# Patient Record
Sex: FEMALE | Race: WHITE | Employment: OTHER | URBAN - NONMETROPOLITAN AREA
[De-identification: names, ages, dates, MRNs, and addresses within clinical notes are randomized per-mention and may not be internally consistent; named-entity substitution may affect disease eponyms.]

---

## 2017-01-05 ENCOUNTER — OFFICE VISIT (OUTPATIENT)
Dept: PHYSICAL MEDICINE AND REHAB | Age: 47
End: 2017-01-05

## 2017-01-05 VITALS
BODY MASS INDEX: 34.16 KG/M2 | HEART RATE: 101 BPM | HEIGHT: 65 IN | SYSTOLIC BLOOD PRESSURE: 135 MMHG | DIASTOLIC BLOOD PRESSURE: 80 MMHG | WEIGHT: 205 LBS

## 2017-01-05 DIAGNOSIS — M54.10 RADICULAR SYNDROME OF RIGHT LEG: Primary | ICD-10-CM

## 2017-01-05 DIAGNOSIS — R60.9 PERIPHERAL EDEMA: ICD-10-CM

## 2017-01-05 DIAGNOSIS — M51.36 DDD (DEGENERATIVE DISC DISEASE), LUMBAR: ICD-10-CM

## 2017-01-05 PROCEDURE — 99214 OFFICE O/P EST MOD 30 MIN: CPT | Performed by: PHYSICAL MEDICINE & REHABILITATION

## 2017-01-05 RX ORDER — OXYCODONE HYDROCHLORIDE AND ACETAMINOPHEN 5; 325 MG/1; MG/1
TABLET ORAL
Qty: 150 TABLET | Refills: 0 | Status: SHIPPED | OUTPATIENT
Start: 2017-01-05 | End: 2017-04-10 | Stop reason: ALTCHOICE

## 2017-01-05 RX ORDER — HYDROCHLOROTHIAZIDE 25 MG/1
25 TABLET ORAL DAILY
Qty: 30 TABLET | Refills: 11 | Status: SHIPPED | OUTPATIENT
Start: 2017-01-05

## 2017-01-05 RX ORDER — OXYCODONE HYDROCHLORIDE AND ACETAMINOPHEN 5; 325 MG/1; MG/1
TABLET ORAL
Qty: 150 TABLET | Refills: 0 | Status: SHIPPED | OUTPATIENT
Start: 2017-01-05 | End: 2017-04-10 | Stop reason: SDUPTHER

## 2017-04-10 ENCOUNTER — OFFICE VISIT (OUTPATIENT)
Dept: PHYSICAL MEDICINE AND REHAB | Age: 47
End: 2017-04-10

## 2017-04-10 VITALS
SYSTOLIC BLOOD PRESSURE: 120 MMHG | HEART RATE: 88 BPM | HEIGHT: 65 IN | DIASTOLIC BLOOD PRESSURE: 79 MMHG | BODY MASS INDEX: 34.82 KG/M2 | WEIGHT: 209 LBS

## 2017-04-10 DIAGNOSIS — M54.10 RADICULAR SYNDROME OF RIGHT LEG: ICD-10-CM

## 2017-04-10 DIAGNOSIS — M51.36 DDD (DEGENERATIVE DISC DISEASE), LUMBAR: Primary | ICD-10-CM

## 2017-04-10 DIAGNOSIS — M25.562 ACUTE PAIN OF BOTH KNEES: ICD-10-CM

## 2017-04-10 DIAGNOSIS — M25.561 ACUTE PAIN OF BOTH KNEES: ICD-10-CM

## 2017-04-10 PROCEDURE — 99214 OFFICE O/P EST MOD 30 MIN: CPT | Performed by: PHYSICAL MEDICINE & REHABILITATION

## 2017-04-10 RX ORDER — POLYETHYLENE GLYCOL 3350 17 G/17G
17 POWDER, FOR SOLUTION ORAL DAILY PRN
Qty: 30 BOTTLE | Refills: 11 | Status: SHIPPED | OUTPATIENT
Start: 2017-04-10 | End: 2021-11-15 | Stop reason: ALTCHOICE

## 2017-04-10 RX ORDER — AMOXICILLIN 250 MG
2 CAPSULE ORAL 2 TIMES DAILY
Qty: 120 TABLET | Refills: 11 | Status: SHIPPED | OUTPATIENT
Start: 2017-04-10 | End: 2018-04-08 | Stop reason: SDUPTHER

## 2017-04-10 RX ORDER — OXYCODONE HYDROCHLORIDE AND ACETAMINOPHEN 5; 325 MG/1; MG/1
TABLET ORAL
Qty: 150 TABLET | Refills: 0 | Status: SHIPPED | OUTPATIENT
Start: 2017-04-10 | End: 2017-07-10 | Stop reason: SDUPTHER

## 2017-04-10 RX ORDER — OXYCODONE HYDROCHLORIDE AND ACETAMINOPHEN 5; 325 MG/1; MG/1
TABLET ORAL
Qty: 150 TABLET | Refills: 0 | Status: SHIPPED | OUTPATIENT
Start: 2017-04-10 | End: 2017-06-12 | Stop reason: SDUPTHER

## 2017-04-10 RX ORDER — FLUTICASONE PROPIONATE 110 UG/1
1 AEROSOL, METERED RESPIRATORY (INHALATION) 2 TIMES DAILY
COMMUNITY
End: 2021-11-15 | Stop reason: ALTCHOICE

## 2017-06-12 RX ORDER — OXYCODONE HYDROCHLORIDE AND ACETAMINOPHEN 5; 325 MG/1; MG/1
TABLET ORAL
Qty: 150 TABLET | Refills: 0 | Status: SHIPPED | OUTPATIENT
Start: 2017-06-13 | End: 2017-07-10 | Stop reason: SDUPTHER

## 2017-06-26 RX ORDER — BACLOFEN 20 MG/1
TABLET ORAL
Qty: 90 TABLET | Refills: 5 | Status: SHIPPED | OUTPATIENT
Start: 2017-06-26 | End: 2017-10-26 | Stop reason: SDUPTHER

## 2017-07-10 ENCOUNTER — OFFICE VISIT (OUTPATIENT)
Dept: PHYSICAL MEDICINE AND REHAB | Age: 47
End: 2017-07-10

## 2017-07-10 VITALS
DIASTOLIC BLOOD PRESSURE: 86 MMHG | WEIGHT: 211.5 LBS | HEIGHT: 65 IN | BODY MASS INDEX: 35.24 KG/M2 | HEART RATE: 87 BPM | SYSTOLIC BLOOD PRESSURE: 138 MMHG

## 2017-07-10 DIAGNOSIS — M25.562 ACUTE PAIN OF BOTH KNEES: ICD-10-CM

## 2017-07-10 DIAGNOSIS — M25.561 ACUTE PAIN OF BOTH KNEES: ICD-10-CM

## 2017-07-10 DIAGNOSIS — M54.10 RADICULAR SYNDROME OF RIGHT LEG: ICD-10-CM

## 2017-07-10 DIAGNOSIS — M51.36 DDD (DEGENERATIVE DISC DISEASE), LUMBAR: Primary | ICD-10-CM

## 2017-07-10 PROCEDURE — 99213 OFFICE O/P EST LOW 20 MIN: CPT | Performed by: NURSE PRACTITIONER

## 2017-07-10 RX ORDER — OXYCODONE HYDROCHLORIDE AND ACETAMINOPHEN 5; 325 MG/1; MG/1
TABLET ORAL
Qty: 150 TABLET | Refills: 0 | Status: SHIPPED | OUTPATIENT
Start: 2017-07-10 | End: 2017-10-26 | Stop reason: SDUPTHER

## 2017-07-10 RX ORDER — GABAPENTIN 600 MG/1
1200 TABLET ORAL 3 TIMES DAILY
Qty: 180 TABLET | Refills: 11 | Status: SHIPPED | OUTPATIENT
Start: 2017-07-10 | End: 2018-06-21 | Stop reason: SDUPTHER

## 2017-07-10 RX ORDER — AMITRIPTYLINE HYDROCHLORIDE 50 MG/1
50 TABLET, FILM COATED ORAL NIGHTLY
Qty: 30 TABLET | Refills: 11 | Status: SHIPPED | OUTPATIENT
Start: 2017-07-10 | End: 2017-10-26 | Stop reason: SDUPTHER

## 2017-07-10 RX ORDER — OXYCODONE HYDROCHLORIDE AND ACETAMINOPHEN 5; 325 MG/1; MG/1
TABLET ORAL
Qty: 150 TABLET | Refills: 0 | Status: SHIPPED | OUTPATIENT
Start: 2017-07-10 | End: 2017-10-02 | Stop reason: SDUPTHER

## 2017-07-10 RX ORDER — CYCLOBENZAPRINE HCL 10 MG
10 TABLET ORAL EVERY 8 HOURS PRN
Qty: 90 TABLET | Refills: 11 | Status: SHIPPED | OUTPATIENT
Start: 2017-07-10 | End: 2018-04-30

## 2017-09-27 ENCOUNTER — TELEPHONE (OUTPATIENT)
Dept: PHYSICAL MEDICINE AND REHAB | Age: 47
End: 2017-09-27

## 2017-10-02 RX ORDER — OXYCODONE HYDROCHLORIDE AND ACETAMINOPHEN 5; 325 MG/1; MG/1
TABLET ORAL
Qty: 150 TABLET | Refills: 0 | Status: SHIPPED | OUTPATIENT
Start: 2017-10-13 | End: 2017-10-26 | Stop reason: SDUPTHER

## 2017-10-05 ENCOUNTER — TELEPHONE (OUTPATIENT)
Dept: PHYSICAL MEDICINE AND REHAB | Age: 47
End: 2017-10-05

## 2017-10-05 NOTE — TELEPHONE ENCOUNTER
----- Message from Emanuel Claudio MD sent at 10/5/2017  7:37 AM EDT -----  Please call patient. She does have some breakdown of cartilage noted in the left knee, otherwise unremarkable bilateral knee xrays.

## 2017-10-26 ENCOUNTER — OFFICE VISIT (OUTPATIENT)
Dept: PHYSICAL MEDICINE AND REHAB | Age: 47
End: 2017-10-26
Payer: COMMERCIAL

## 2017-10-26 VITALS
WEIGHT: 193 LBS | HEART RATE: 94 BPM | DIASTOLIC BLOOD PRESSURE: 73 MMHG | SYSTOLIC BLOOD PRESSURE: 115 MMHG | BODY MASS INDEX: 32.95 KG/M2 | HEIGHT: 64 IN

## 2017-10-26 DIAGNOSIS — M25.561 ACUTE PAIN OF BOTH KNEES: ICD-10-CM

## 2017-10-26 DIAGNOSIS — M51.36 DDD (DEGENERATIVE DISC DISEASE), LUMBAR: Primary | ICD-10-CM

## 2017-10-26 DIAGNOSIS — M25.562 ACUTE PAIN OF BOTH KNEES: ICD-10-CM

## 2017-10-26 DIAGNOSIS — M54.10 RADICULAR SYNDROME OF RIGHT LEG: ICD-10-CM

## 2017-10-26 PROCEDURE — 99213 OFFICE O/P EST LOW 20 MIN: CPT | Performed by: NURSE PRACTITIONER

## 2017-10-26 PROCEDURE — 4004F PT TOBACCO SCREEN RCVD TLK: CPT | Performed by: NURSE PRACTITIONER

## 2017-10-26 PROCEDURE — G8417 CALC BMI ABV UP PARAM F/U: HCPCS | Performed by: NURSE PRACTITIONER

## 2017-10-26 PROCEDURE — G8427 DOCREV CUR MEDS BY ELIG CLIN: HCPCS | Performed by: NURSE PRACTITIONER

## 2017-10-26 PROCEDURE — G8484 FLU IMMUNIZE NO ADMIN: HCPCS | Performed by: NURSE PRACTITIONER

## 2017-10-26 RX ORDER — OXYCODONE HYDROCHLORIDE AND ACETAMINOPHEN 5; 325 MG/1; MG/1
TABLET ORAL
Qty: 150 TABLET | Refills: 0 | Status: SHIPPED | OUTPATIENT
Start: 2017-10-26 | End: 2018-01-29 | Stop reason: SDUPTHER

## 2017-10-26 RX ORDER — BACLOFEN 20 MG/1
TABLET ORAL
Qty: 90 TABLET | Refills: 5 | Status: SHIPPED | OUTPATIENT
Start: 2017-10-26 | End: 2018-01-29 | Stop reason: SDUPTHER

## 2017-10-26 RX ORDER — AMITRIPTYLINE HYDROCHLORIDE 75 MG/1
75 TABLET, FILM COATED ORAL NIGHTLY
Qty: 30 TABLET | Refills: 1 | Status: SHIPPED | OUTPATIENT
Start: 2017-10-26 | End: 2017-12-23 | Stop reason: SDUPTHER

## 2017-10-26 NOTE — PROGRESS NOTES
and lower extremity pulses; Edema: no      Impression:  · Low back pain  · DDD lumbar spine s/p decompression and fusion 1/2014  · L5-S1 disc herniation  · L4-5 disc herniation  · Bilateral knee pain  · Right lower limb radiculopathy    Plan:  · Flexeril TID  · Baclofen 20 mg TID  · Percocet PRN for pain  · Continue gabapentin  · Increase amitriptyline to 75 mg nightly  · Encouraged patient to try OTC support brace for bilateral knees    Will continue to monitor any benefits vs side effects of the medications as prescribed. The patient has been warned about the risk of operating machinery including driving if impaired in any way by these medications. The patient also accepts the risks of tolerance, dependency, or addiction related to the prescribed medications. All questions were answered. Reevaluation as planned, or sooner if requested. Controlled Substances Monitoring: Attestation: The Prescription Monitoring Report for this patient was reviewed today. Andry Stockton NP)  Documentation: Possible medication side effects, risk of tolerance and/or dependence, and alternative treatments discussed., No signs of potential drug abuse or diversion identified. Andry Stockton NP)    Return in about 3 months (around 1/26/2018). It was my pleasure to evaluate Yohan Barrett today. Please call with any concerns or questions. 15 minutes spent in evaluation efforts    Andry Stockton NP     Yohan Barrett was evaluated today and a DME order was entered for a lightweight wheelchair because she requires this to successfully complete daily living tasks of personal cares and ambulating. A lightweight wheelchair is necessary due to the patient's impaired ambulation and mobility restrictions. The patient would not be able to resolve these daily living tasks using a cane or walker.   The patient can self-propel a lightweight wheelchair safely in their home and can maneuver within their home with adequate access. The patient cannot self-propel in a standard wheelchair. The need for this equipment was discussed with the patient and she understands, is in agreement, and has not expressed an unwillingness to use the wheelchair.

## 2017-11-08 ENCOUNTER — TELEPHONE (OUTPATIENT)
Dept: PHYSICAL MEDICINE AND REHAB | Age: 47
End: 2017-11-08

## 2017-11-08 DIAGNOSIS — M54.10 RADICULAR SYNDROME OF RIGHT LEG: ICD-10-CM

## 2017-11-08 DIAGNOSIS — M51.36 DDD (DEGENERATIVE DISC DISEASE), LUMBAR: ICD-10-CM

## 2017-11-08 DIAGNOSIS — R29.898 WEAKNESS OF BOTH UPPER EXTREMITIES: Primary | ICD-10-CM

## 2017-11-08 DIAGNOSIS — R26.9 GAIT DISTURBANCE: ICD-10-CM

## 2017-11-08 NOTE — TELEPHONE ENCOUNTER
Aida Choi from [employer] called regarding recent order for wheelchair. Order was declined by insurance. Aida Choi requesting Dx of upper body weakness to help appeal denial. Please advise.

## 2017-11-10 ENCOUNTER — TELEPHONE (OUTPATIENT)
Dept: PHYSICAL MEDICINE AND REHAB | Age: 47
End: 2017-11-10

## 2017-12-26 RX ORDER — AMITRIPTYLINE HYDROCHLORIDE 75 MG/1
TABLET, FILM COATED ORAL
Qty: 30 TABLET | Refills: 1 | Status: SHIPPED | OUTPATIENT
Start: 2017-12-26 | End: 2018-01-29 | Stop reason: SDUPTHER

## 2018-01-29 ENCOUNTER — OFFICE VISIT (OUTPATIENT)
Dept: PHYSICAL MEDICINE AND REHAB | Age: 48
End: 2018-01-29
Payer: COMMERCIAL

## 2018-01-29 VITALS
BODY MASS INDEX: 34.66 KG/M2 | HEART RATE: 91 BPM | WEIGHT: 203 LBS | DIASTOLIC BLOOD PRESSURE: 80 MMHG | SYSTOLIC BLOOD PRESSURE: 117 MMHG | HEIGHT: 64 IN

## 2018-01-29 DIAGNOSIS — M51.36 DDD (DEGENERATIVE DISC DISEASE), LUMBAR: Primary | ICD-10-CM

## 2018-01-29 DIAGNOSIS — M25.561 CHRONIC PAIN OF BOTH KNEES: ICD-10-CM

## 2018-01-29 DIAGNOSIS — M25.562 CHRONIC PAIN OF BOTH KNEES: ICD-10-CM

## 2018-01-29 DIAGNOSIS — G89.29 CHRONIC PAIN OF BOTH KNEES: ICD-10-CM

## 2018-01-29 DIAGNOSIS — M54.10 RADICULAR SYNDROME OF RIGHT LEG: ICD-10-CM

## 2018-01-29 PROCEDURE — 99213 OFFICE O/P EST LOW 20 MIN: CPT | Performed by: NURSE PRACTITIONER

## 2018-01-29 PROCEDURE — 4004F PT TOBACCO SCREEN RCVD TLK: CPT | Performed by: NURSE PRACTITIONER

## 2018-01-29 PROCEDURE — G8484 FLU IMMUNIZE NO ADMIN: HCPCS | Performed by: NURSE PRACTITIONER

## 2018-01-29 PROCEDURE — G8417 CALC BMI ABV UP PARAM F/U: HCPCS | Performed by: NURSE PRACTITIONER

## 2018-01-29 PROCEDURE — G8427 DOCREV CUR MEDS BY ELIG CLIN: HCPCS | Performed by: NURSE PRACTITIONER

## 2018-01-29 RX ORDER — OXYCODONE HYDROCHLORIDE AND ACETAMINOPHEN 5; 325 MG/1; MG/1
TABLET ORAL
Qty: 150 TABLET | Refills: 0 | Status: SHIPPED | OUTPATIENT
Start: 2018-01-29 | End: 2018-04-10

## 2018-01-29 RX ORDER — AMITRIPTYLINE HYDROCHLORIDE 75 MG/1
TABLET, FILM COATED ORAL
Qty: 30 TABLET | Refills: 5 | Status: SHIPPED | OUTPATIENT
Start: 2018-01-29 | End: 2018-06-21 | Stop reason: SDUPTHER

## 2018-01-29 RX ORDER — BACLOFEN 20 MG/1
20 TABLET ORAL 4 TIMES DAILY
Qty: 120 TABLET | Refills: 11 | Status: SHIPPED | OUTPATIENT
Start: 2018-01-29 | End: 2018-12-18 | Stop reason: SDUPTHER

## 2018-01-29 RX ORDER — OXYCODONE HYDROCHLORIDE AND ACETAMINOPHEN 5; 325 MG/1; MG/1
TABLET ORAL
Qty: 150 TABLET | Refills: 0 | Status: SHIPPED | OUTPATIENT
Start: 2018-01-29 | End: 2018-04-30 | Stop reason: SDUPTHER

## 2018-01-29 RX ORDER — OXYCODONE HYDROCHLORIDE AND ACETAMINOPHEN 5; 325 MG/1; MG/1
TABLET ORAL
Qty: 150 TABLET | Refills: 0 | Status: SHIPPED | OUTPATIENT
Start: 2018-01-29 | End: 2018-03-10

## 2018-04-09 RX ORDER — DOCUSATE SODIUM -SENNOSIDES 50; 8.6 MG/1; MG/1
TABLET, COATED ORAL
Qty: 120 TABLET | Refills: 0 | Status: SHIPPED | OUTPATIENT
Start: 2018-04-09 | End: 2018-05-21 | Stop reason: SDUPTHER

## 2018-04-30 ENCOUNTER — OFFICE VISIT (OUTPATIENT)
Dept: PHYSICAL MEDICINE AND REHAB | Age: 48
End: 2018-04-30
Payer: COMMERCIAL

## 2018-04-30 VITALS
HEART RATE: 91 BPM | DIASTOLIC BLOOD PRESSURE: 89 MMHG | WEIGHT: 203.04 LBS | SYSTOLIC BLOOD PRESSURE: 142 MMHG | BODY MASS INDEX: 34.66 KG/M2 | HEIGHT: 64 IN

## 2018-04-30 DIAGNOSIS — G89.29 CHRONIC PAIN OF BOTH KNEES: ICD-10-CM

## 2018-04-30 DIAGNOSIS — M25.562 CHRONIC PAIN OF BOTH KNEES: ICD-10-CM

## 2018-04-30 DIAGNOSIS — M54.10 RADICULAR SYNDROME OF RIGHT LEG: ICD-10-CM

## 2018-04-30 DIAGNOSIS — M54.10 RADICULAR SYNDROME: ICD-10-CM

## 2018-04-30 DIAGNOSIS — M51.36 DDD (DEGENERATIVE DISC DISEASE), LUMBAR: Primary | ICD-10-CM

## 2018-04-30 DIAGNOSIS — M54.42 CHRONIC RIGHT-SIDED LOW BACK PAIN WITH BILATERAL SCIATICA: ICD-10-CM

## 2018-04-30 DIAGNOSIS — M25.561 CHRONIC PAIN OF BOTH KNEES: ICD-10-CM

## 2018-04-30 DIAGNOSIS — M54.41 CHRONIC RIGHT-SIDED LOW BACK PAIN WITH BILATERAL SCIATICA: ICD-10-CM

## 2018-04-30 DIAGNOSIS — G89.29 CHRONIC RIGHT-SIDED LOW BACK PAIN WITH BILATERAL SCIATICA: ICD-10-CM

## 2018-04-30 PROCEDURE — 99213 OFFICE O/P EST LOW 20 MIN: CPT | Performed by: NURSE PRACTITIONER

## 2018-04-30 PROCEDURE — G8417 CALC BMI ABV UP PARAM F/U: HCPCS | Performed by: NURSE PRACTITIONER

## 2018-04-30 PROCEDURE — 1036F TOBACCO NON-USER: CPT | Performed by: NURSE PRACTITIONER

## 2018-04-30 PROCEDURE — G8427 DOCREV CUR MEDS BY ELIG CLIN: HCPCS | Performed by: NURSE PRACTITIONER

## 2018-04-30 RX ORDER — OXYCODONE HYDROCHLORIDE AND ACETAMINOPHEN 5; 325 MG/1; MG/1
TABLET ORAL
Qty: 150 TABLET | Refills: 0 | Status: SHIPPED | OUTPATIENT
Start: 2018-04-30 | End: 2018-08-08

## 2018-04-30 RX ORDER — OXYCODONE HYDROCHLORIDE AND ACETAMINOPHEN 5; 325 MG/1; MG/1
TABLET ORAL
Qty: 150 TABLET | Refills: 0 | Status: SHIPPED | OUTPATIENT
Start: 2018-04-30 | End: 2018-06-21 | Stop reason: SDUPTHER

## 2018-04-30 RX ORDER — TIZANIDINE HYDROCHLORIDE 2 MG/1
2 CAPSULE, GELATIN COATED ORAL 3 TIMES DAILY
Qty: 90 CAPSULE | Refills: 0 | Status: SHIPPED | OUTPATIENT
Start: 2018-04-30 | End: 2018-05-17

## 2018-05-16 ENCOUNTER — TELEPHONE (OUTPATIENT)
Dept: PHYSICAL MEDICINE AND REHAB | Age: 48
End: 2018-05-16

## 2018-05-17 RX ORDER — CYCLOBENZAPRINE HCL 10 MG
10 TABLET ORAL EVERY 8 HOURS PRN
Qty: 90 TABLET | Refills: 1 | Status: SHIPPED | OUTPATIENT
Start: 2018-05-17 | End: 2018-09-20 | Stop reason: SDUPTHER

## 2018-05-21 RX ORDER — AMOXICILLIN 250 MG
CAPSULE ORAL
Qty: 120 TABLET | Refills: 11 | Status: SHIPPED | OUTPATIENT
Start: 2018-05-21

## 2018-05-22 ENCOUNTER — TELEPHONE (OUTPATIENT)
Dept: PHYSICAL MEDICINE AND REHAB | Age: 48
End: 2018-05-22

## 2018-05-22 DIAGNOSIS — M51.36 DDD (DEGENERATIVE DISC DISEASE), LUMBAR: Primary | ICD-10-CM

## 2018-05-22 DIAGNOSIS — M51.27 HERNIATION OF INTERVERTEBRAL DISC BETWEEN L5 AND S1: ICD-10-CM

## 2018-05-22 DIAGNOSIS — M51.26 LUMBAR DISC HERNIATION: ICD-10-CM

## 2018-05-25 ENCOUNTER — HOSPITAL ENCOUNTER (OUTPATIENT)
Dept: PHYSICAL THERAPY | Age: 48
Setting detail: THERAPIES SERIES
Discharge: HOME | End: 2018-05-25
Payer: COMMERCIAL

## 2018-06-21 ENCOUNTER — OFFICE VISIT (OUTPATIENT)
Dept: PHYSICAL MEDICINE AND REHAB | Age: 48
End: 2018-06-21
Payer: COMMERCIAL

## 2018-06-21 VITALS
SYSTOLIC BLOOD PRESSURE: 126 MMHG | BODY MASS INDEX: 34.49 KG/M2 | HEIGHT: 65 IN | HEART RATE: 102 BPM | DIASTOLIC BLOOD PRESSURE: 78 MMHG | WEIGHT: 207 LBS

## 2018-06-21 DIAGNOSIS — M25.562 CHRONIC PAIN OF BOTH KNEES: ICD-10-CM

## 2018-06-21 DIAGNOSIS — M51.36 DDD (DEGENERATIVE DISC DISEASE), LUMBAR: ICD-10-CM

## 2018-06-21 DIAGNOSIS — M79.605 LUMBAR PAIN WITH RADIATION DOWN BOTH LEGS: Primary | ICD-10-CM

## 2018-06-21 DIAGNOSIS — M79.604 LUMBAR PAIN WITH RADIATION DOWN BOTH LEGS: Primary | ICD-10-CM

## 2018-06-21 DIAGNOSIS — M54.10 RADICULAR SYNDROME: ICD-10-CM

## 2018-06-21 DIAGNOSIS — M25.561 CHRONIC PAIN OF BOTH KNEES: ICD-10-CM

## 2018-06-21 DIAGNOSIS — G89.29 CHRONIC PAIN OF BOTH KNEES: ICD-10-CM

## 2018-06-21 DIAGNOSIS — M54.50 LUMBAR PAIN WITH RADIATION DOWN BOTH LEGS: Primary | ICD-10-CM

## 2018-06-21 PROCEDURE — 99213 OFFICE O/P EST LOW 20 MIN: CPT | Performed by: NURSE PRACTITIONER

## 2018-06-21 PROCEDURE — G8427 DOCREV CUR MEDS BY ELIG CLIN: HCPCS | Performed by: NURSE PRACTITIONER

## 2018-06-21 PROCEDURE — 1036F TOBACCO NON-USER: CPT | Performed by: NURSE PRACTITIONER

## 2018-06-21 PROCEDURE — G8417 CALC BMI ABV UP PARAM F/U: HCPCS | Performed by: NURSE PRACTITIONER

## 2018-06-21 RX ORDER — AMITRIPTYLINE HYDROCHLORIDE 75 MG/1
TABLET, FILM COATED ORAL
Qty: 30 TABLET | Refills: 5 | Status: SHIPPED | OUTPATIENT
Start: 2018-06-21 | End: 2018-12-04 | Stop reason: SDUPTHER

## 2018-06-21 RX ORDER — GABAPENTIN 600 MG/1
1200 TABLET ORAL 3 TIMES DAILY
Qty: 180 TABLET | Refills: 11 | Status: SHIPPED | OUTPATIENT
Start: 2018-06-21 | End: 2018-09-24

## 2018-06-21 RX ORDER — OXYCODONE HYDROCHLORIDE AND ACETAMINOPHEN 5; 325 MG/1; MG/1
TABLET ORAL
Qty: 150 TABLET | Refills: 0 | Status: SHIPPED | OUTPATIENT
Start: 2018-06-21 | End: 2018-08-30 | Stop reason: SDUPTHER

## 2018-06-21 RX ORDER — OXYCODONE HYDROCHLORIDE AND ACETAMINOPHEN 5; 325 MG/1; MG/1
TABLET ORAL
Qty: 150 TABLET | Refills: 0 | Status: SHIPPED | OUTPATIENT
Start: 2018-06-21 | End: 2018-09-20 | Stop reason: SDUPTHER

## 2018-07-24 DIAGNOSIS — M51.36 DDD (DEGENERATIVE DISC DISEASE), LUMBAR: ICD-10-CM

## 2018-07-24 RX ORDER — CYCLOBENZAPRINE HCL 10 MG
10 TABLET ORAL EVERY 8 HOURS PRN
Qty: 90 TABLET | Refills: 5 | Status: SHIPPED | OUTPATIENT
Start: 2018-07-24 | End: 2018-12-18 | Stop reason: SDUPTHER

## 2018-08-30 DIAGNOSIS — G89.29 CHRONIC PAIN OF BOTH KNEES: ICD-10-CM

## 2018-08-30 DIAGNOSIS — M25.562 CHRONIC PAIN OF BOTH KNEES: ICD-10-CM

## 2018-08-30 DIAGNOSIS — M25.561 CHRONIC PAIN OF BOTH KNEES: ICD-10-CM

## 2018-08-30 NOTE — TELEPHONE ENCOUNTER
OARRS reviewed. UDS: Gabapentin POSITIVE CONSISTENT  Oxycodone Not Detected SEE REMARK   Noroxycodone POSITIVE CONSISTENT. Last seen: 6/21/2018.  Follow-up: 9/24/18

## 2018-08-31 RX ORDER — OXYCODONE HYDROCHLORIDE AND ACETAMINOPHEN 5; 325 MG/1; MG/1
TABLET ORAL
Qty: 150 TABLET | Refills: 0 | Status: SHIPPED | OUTPATIENT
Start: 2018-09-08 | End: 2018-09-24 | Stop reason: SDUPTHER

## 2018-09-08 ENCOUNTER — HOSPITAL ENCOUNTER (OUTPATIENT)
Dept: MRI IMAGING | Age: 48
Discharge: HOME | End: 2018-09-08
Payer: COMMERCIAL

## 2018-09-08 DIAGNOSIS — M79.604 LUMBAR PAIN WITH RADIATION DOWN BOTH LEGS: ICD-10-CM

## 2018-09-08 DIAGNOSIS — M79.605 LUMBAR PAIN WITH RADIATION DOWN BOTH LEGS: ICD-10-CM

## 2018-09-08 DIAGNOSIS — M54.50 LUMBAR PAIN WITH RADIATION DOWN BOTH LEGS: ICD-10-CM

## 2018-09-08 PROCEDURE — 72148 MRI LUMBAR SPINE W/O DYE: CPT

## 2018-09-10 ENCOUNTER — TELEPHONE (OUTPATIENT)
Dept: PHYSICAL MEDICINE AND REHAB | Age: 48
End: 2018-09-10

## 2018-09-20 ENCOUNTER — OFFICE VISIT (OUTPATIENT)
Dept: PHYSICAL MEDICINE AND REHAB | Age: 48
End: 2018-09-20
Payer: COMMERCIAL

## 2018-09-20 VITALS
HEART RATE: 87 BPM | DIASTOLIC BLOOD PRESSURE: 80 MMHG | SYSTOLIC BLOOD PRESSURE: 124 MMHG | WEIGHT: 208 LBS | BODY MASS INDEX: 34.66 KG/M2 | HEIGHT: 65 IN

## 2018-09-20 DIAGNOSIS — G89.4 CHRONIC PAIN SYNDROME: ICD-10-CM

## 2018-09-20 DIAGNOSIS — M62.838 SPASM OF MUSCLE: ICD-10-CM

## 2018-09-20 DIAGNOSIS — M47.816 LUMBAR SPONDYLOSIS: Primary | ICD-10-CM

## 2018-09-20 DIAGNOSIS — M48.062 LUMBAR STENOSIS WITH NEUROGENIC CLAUDICATION: ICD-10-CM

## 2018-09-20 DIAGNOSIS — M54.16 LUMBAR RADICULOPATHY: ICD-10-CM

## 2018-09-20 DIAGNOSIS — M54.50 LUMBAR PAIN: ICD-10-CM

## 2018-09-20 PROCEDURE — 99215 OFFICE O/P EST HI 40 MIN: CPT | Performed by: NURSE PRACTITIONER

## 2018-09-20 ASSESSMENT — ENCOUNTER SYMPTOMS
COUGH: 0
SHORTNESS OF BREATH: 0
EYE PAIN: 0
RHINORRHEA: 0
SINUS PRESSURE: 0
NAUSEA: 0
CHEST TIGHTNESS: 0
CONSTIPATION: 0
EYE ITCHING: 0
ABDOMINAL PAIN: 0
EYE REDNESS: 0
BACK PAIN: 1
COLOR CHANGE: 0
VOMITING: 0
DIARRHEA: 0
SINUS PAIN: 0

## 2018-09-20 NOTE — PROGRESS NOTES
her mother and sister. Social History  Bubba Yates  reports that she quit smoking about 5 months ago. She has a 30.00 pack-year smoking history. She has never used smokeless tobacco. She reports that she does not drink alcohol or use drugs. Medications    Current Outpatient Prescriptions:     oxyCODONE-acetaminophen (PERCOCET) 5-325 MG per tablet, 1-2 tabs every 4-6 hours as needed for pain. Fill on/after 8/9/2018., Disp: 150 tablet, Rfl: 0    cyclobenzaprine (FLEXERIL) 10 MG tablet, TAKE 1 TABLET BY MOUTH EVERY 8 HOURS AS NEEDED FOR MUSCLE SPASMS, Disp: 90 tablet, Rfl: 5    amitriptyline (ELAVIL) 75 MG tablet, TAKE 1 TABLET BY MOUTH EVERY NIGHT, Disp: 30 tablet, Rfl: 5    gabapentin (NEURONTIN) 600 MG tablet, Take 2 tablets by mouth 3 times daily for 180 days. ., Disp: 180 tablet, Rfl: 11    senna-docusate (SENEXON-S) 8.6-50 MG per tablet, TAKE 2 TABLETS BY MOUTH TWICE DAILY, Disp: 120 tablet, Rfl: 11    baclofen (LIORESAL) 20 MG tablet, Take 1 tablet by mouth 4 times daily, Disp: 120 tablet, Rfl: 11    fluticasone (FLOVENT HFA) 110 MCG/ACT inhaler, Inhale 1 puff into the lungs 2 times daily, Disp: , Rfl:     polyethylene glycol (MIRALAX) powder, Take 17 g by mouth daily as needed (constipation), Disp: 30 Bottle, Rfl: 11    hydrochlorothiazide (HYDRODIURIL) 25 MG tablet, Take 1 tablet by mouth daily, Disp: 30 tablet, Rfl: 11    Insulin Degludec (TRESIBA FLEXTOUCH SC), Inject 40 Units into the skin At bedtime , Disp: , Rfl:     fenofibrate (TRICOR) 145 MG tablet, nightly , Disp: , Rfl:     atorvastatin (LIPITOR) 80 MG tablet, Take 80 mg by mouth daily, Disp: , Rfl:     omeprazole (PRILOSEC) 20 MG capsule, , Disp: , Rfl: 11    pravastatin (PRAVACHOL) 10 MG tablet, 10 mg daily , Disp: , Rfl: 10    lisinopril (PRINIVIL;ZESTRIL) 5 MG tablet, Take 5 mg by mouth daily. , Disp: , Rfl:     albuterol (PROAIR HFA) 108 (90 BASE) MCG/ACT inhaler, Inhale 2 puffs into the lungs as needed for Wheezing., Disp: , Previous Treatments tried:  · PT: Yes,  any benefit? No, how many weeks? unsure, last date done: 2016  · NSAIDs: Yes,  any benefit? No, trled Mobic 15mg for about 1 year without relief  · Chiropractic: No  · Muscle relaxants: Yes, baclofen and flexeril;  any benefit? Yes  · Narcotics: Yes,  any benefit? Yes, short term  · Spine surgeon consult: Yes  · Any Implants: No    Meds. Prescribed:   No orders of the defined types were placed in this encounter. Return in about 6 weeks (around 11/1/2018). Time spent with patient was 60 minutes more than 50% was spent  Counseling/coordinated the patient's care.     Electronically signed by NEIL Fink CNP on 9/20/2018 at 9:48 AM

## 2018-09-24 ENCOUNTER — OFFICE VISIT (OUTPATIENT)
Dept: PHYSICAL MEDICINE AND REHAB | Age: 48
End: 2018-09-24
Payer: COMMERCIAL

## 2018-09-24 VITALS
HEIGHT: 65 IN | SYSTOLIC BLOOD PRESSURE: 130 MMHG | BODY MASS INDEX: 34.66 KG/M2 | HEART RATE: 87 BPM | WEIGHT: 208 LBS | DIASTOLIC BLOOD PRESSURE: 86 MMHG

## 2018-09-24 DIAGNOSIS — M25.562 CHRONIC PAIN OF BOTH KNEES: ICD-10-CM

## 2018-09-24 DIAGNOSIS — G89.4 CHRONIC PAIN SYNDROME: ICD-10-CM

## 2018-09-24 DIAGNOSIS — M25.561 CHRONIC PAIN OF BOTH KNEES: ICD-10-CM

## 2018-09-24 DIAGNOSIS — M51.36 DDD (DEGENERATIVE DISC DISEASE), LUMBAR: Primary | ICD-10-CM

## 2018-09-24 DIAGNOSIS — G89.29 CHRONIC PAIN OF BOTH KNEES: ICD-10-CM

## 2018-09-24 PROCEDURE — 1036F TOBACCO NON-USER: CPT | Performed by: NURSE PRACTITIONER

## 2018-09-24 PROCEDURE — G8427 DOCREV CUR MEDS BY ELIG CLIN: HCPCS | Performed by: NURSE PRACTITIONER

## 2018-09-24 PROCEDURE — G8417 CALC BMI ABV UP PARAM F/U: HCPCS | Performed by: NURSE PRACTITIONER

## 2018-09-24 PROCEDURE — 99213 OFFICE O/P EST LOW 20 MIN: CPT | Performed by: NURSE PRACTITIONER

## 2018-09-24 RX ORDER — OXYCODONE HYDROCHLORIDE AND ACETAMINOPHEN 5; 325 MG/1; MG/1
1 TABLET ORAL EVERY 6 HOURS PRN
Qty: 150 TABLET | Refills: 0 | Status: SHIPPED | OUTPATIENT
Start: 2018-09-24 | End: 2018-10-24

## 2018-09-24 RX ORDER — PREGABALIN 75 MG/1
75 CAPSULE ORAL 2 TIMES DAILY
Qty: 60 CAPSULE | Refills: 5 | Status: SHIPPED | OUTPATIENT
Start: 2018-09-24 | End: 2018-10-02

## 2018-10-01 ENCOUNTER — TELEPHONE (OUTPATIENT)
Dept: PHYSICAL MEDICINE AND REHAB | Age: 48
End: 2018-10-01

## 2018-10-01 DIAGNOSIS — M47.26 OTHER SPONDYLOSIS WITH RADICULOPATHY, LUMBAR REGION: Primary | ICD-10-CM

## 2018-10-02 ENCOUNTER — HOSPITAL ENCOUNTER (OUTPATIENT)
Dept: PHYSICAL THERAPY | Age: 48
Setting detail: THERAPIES SERIES
Discharge: HOME | End: 2018-10-02
Payer: COMMERCIAL

## 2018-10-02 PROCEDURE — 97161 PT EVAL LOW COMPLEX 20 MIN: CPT

## 2018-10-02 PROCEDURE — G0283 ELEC STIM OTHER THAN WOUND: HCPCS

## 2018-10-02 RX ORDER — PREGABALIN 100 MG/1
100 CAPSULE ORAL 3 TIMES DAILY
Qty: 90 CAPSULE | Refills: 5 | Status: SHIPPED | OUTPATIENT
Start: 2018-10-02 | End: 2018-12-18 | Stop reason: SDUPTHER

## 2018-10-02 ASSESSMENT — PAIN DESCRIPTION - DESCRIPTORS: DESCRIPTORS: CONSTANT

## 2018-10-02 ASSESSMENT — PAIN SCALES - GENERAL: PAINLEVEL_OUTOF10: 9

## 2018-10-02 ASSESSMENT — PAIN DESCRIPTION - ORIENTATION: ORIENTATION: LOWER

## 2018-10-02 ASSESSMENT — PAIN DESCRIPTION - LOCATION: LOCATION: BACK

## 2018-10-02 ASSESSMENT — PAIN DESCRIPTION - PAIN TYPE: TYPE: CHRONIC PAIN

## 2018-10-02 NOTE — PROGRESS NOTES
2001 and that is when pain began. Pt underwent L5-S1 fusion in 2015 without relief. Pt reports doing aquatic therapy 2 years ago, and land prior to surgery. Pt reports aquatic therapy made symptoms worse. Pt reports CNP is recommended injections but insurance requires therapy prior to approval. Pt c/o LBP with radiating pain down RLE to toes. Pain is constant with all activity increasing pain. Pt taking prescribed Percocet without symptom relief. Pt reports nothing alleviates pain.        Pain:  Patient Currently in Pain: Yes  Pain Assessment: 0-10  Pain Level: 9  Pain Type: Chronic pain  Pain Location: Back  Pain Orientation: Lower  Pain Radiating Towards: RLE down to toes  Pain Descriptors: Constant  Effect of Pain on Daily Activities: increases to 10/10 with activity     Social/Functional History:    Lives With: Family  Type of Home: House  Home Layout: One level, Laundry in basement  Home Access: Level entry   IADL Comments: Pt reports doctor instructed her not to do household chores, but she does light duties  ADL Assistance: [address] [place] [address] Avenue: Independent  Homemaking Responsibilities: Yes  Ambulation Assistance: Independent  Transfer Assistance: Independent    Active : Yes  Mode of Transportation: Car  Occupation: Unemployed    Objective  Overall Orientation Status: Within Normal Limits          Observation/Palpation  Posture: Fair  Palpation: very sensitive to touch at lumbar region and hips  Observation: slight anterior pelvic tilt, overweight with large abdominal region; iliac crest, popliteal fossa and medial malleoli level in standing; left shoulder lower with slight left lateral trunk lean in standing; sits with slouched posturing      Lumbar: standing flexion reaches to knees with pulling pain, extension neutral, sidebending min restriction with pain, rotation max restriction-pt refused to demo d/t pain    ROM RLE: CHERI, [place] max restriction with low back and hip pain, SLR and monitor symptoms after estim. Unload in sidelying with pillow between knees every 2 hours with heating pad x15-20 min. Plan:  Times per week: 1-2x  Plan weeks: 8 weeks  Specific instructions for Next Treatment: stretches as tolerated, abdominal bracing, pelvic tilts on MHP  Current Treatment Recommendations: Strengthening, ROM, Aquatics, Modalities, Home Exercise Program, Manual Therapy - Soft Tissue Mobilization, Patient/Caregiver Education & Training  Plan Comment: POC initiated. Plan to trial land but do aquatics if land not tolerated. History: Personal factors or comorbidities that impact plan of care - High Complexity: 3 or more personal factors or comorbidities. See history section above for details. Examination: Body structures and functions, activity limitations, participation restrictions; using standardized tests and measures - High Complexity: 4 or more body structures and functional, activity limitations and/or participation restrictions. See restrictions and objective section above for details. Clinical Presentation: Low - Stable and Uncomplicated: chronic symptoms without evolving characteristics    Decision Making: Moderate Complexity due to high pain levels, multiple unsuccessful conservative treatments, impaired mobility. Decision making was based on patient assessment and decision making process in determining plan of care and establishing reasonable expectations for measurable functional outcomes. Evaluation Complexity: Based on the findings of patient history, examination, clinical presentation, and decision making during this evaluation, the evaluation of Giovana Menendez  is of low complexity. Goals:  Patient goals : Decrease pain or get injections    Short term goals  Time Frame for Short term goals: 6 weeks  Short term goal 1: Pt will report 25% reduction in lumbar pain and RLE radiculopathy for ease with ADLs and functional mobility.    Short term goal 2: Pt will demo good core engagement and postural awareness with <2 verbal cues during therapy session to carry over to IADLs. Short term goal 3: Pt will improve B hip strength to 4-/5 for ease with dressing, walking, and lifting. Short term goal 4: Pt will demo moderate lumbar/hip AROM restriction for ease reaching to floor to retrieve objects and performing ADLs. Long term goals  Time Frame for Long term goals : 8 weeks  Long term goal 1: Pt will be independent and compliant with HEP to achieve above goals.           Shefali Alejandro, PT

## 2018-10-09 ENCOUNTER — APPOINTMENT (OUTPATIENT)
Dept: PHYSICAL THERAPY | Age: 48
End: 2018-10-09
Payer: COMMERCIAL

## 2018-10-16 ENCOUNTER — HOSPITAL ENCOUNTER (OUTPATIENT)
Dept: PHYSICAL THERAPY | Age: 48
Setting detail: THERAPIES SERIES
Discharge: HOME | End: 2018-10-16
Payer: COMMERCIAL

## 2018-10-16 NOTE — PROGRESS NOTES
[facility]  PHYSICAL THERAPY MISSED TREATMENT NOTE  OUTPATIENT REHABILITATION    Date: 10/16/2018  Patient Name: Azalea Perez        MRN: [id number]   : 1970  (50 y.o.)  Gender: female   Referring Practitioner: Cheryle Fredericks, CNP  Diagnosis: Lumbar spondylosis M47.816; Lumbar radiculopathy M54.16; Lumbar pain 54.5; Lumbar stenosis with neurogenic claudication M48.062; Chronic pain syndrome G89.4; Spasm of muscle M62.838    PT Visit Information  No Show: 1  Canceled Appointment: 1    REASON FOR MISSED TREATMENT:  Pt no show/no call for appointment today. Called patient and left a message informing pt of next appointment time and date. Also informed patient if that she missed her next appointment that she would be discharged.      Dee Robles PTA [id number]

## 2018-10-23 ENCOUNTER — HOSPITAL ENCOUNTER (OUTPATIENT)
Dept: PHYSICAL THERAPY | Age: 48
Setting detail: THERAPIES SERIES
Discharge: HOME | End: 2018-10-23
Payer: COMMERCIAL

## 2018-10-23 PROCEDURE — 97110 THERAPEUTIC EXERCISES: CPT

## 2018-10-23 PROCEDURE — G0283 ELEC STIM OTHER THAN WOUND: HCPCS

## 2018-10-23 ASSESSMENT — PAIN SCALES - GENERAL: PAINLEVEL_OUTOF10: 7

## 2018-10-23 ASSESSMENT — PAIN DESCRIPTION - LOCATION: LOCATION: BACK

## 2018-10-23 ASSESSMENT — PAIN DESCRIPTION - ORIENTATION: ORIENTATION: LOWER

## 2018-10-30 ENCOUNTER — HOSPITAL ENCOUNTER (OUTPATIENT)
Dept: PHYSICAL THERAPY | Age: 48
Setting detail: THERAPIES SERIES
Discharge: HOME | End: 2018-10-30
Payer: COMMERCIAL

## 2018-10-30 PROCEDURE — 97110 THERAPEUTIC EXERCISES: CPT

## 2018-10-30 PROCEDURE — G0283 ELEC STIM OTHER THAN WOUND: HCPCS

## 2018-10-30 ASSESSMENT — PAIN DESCRIPTION - PAIN TYPE: TYPE: CHRONIC PAIN

## 2018-10-30 ASSESSMENT — PAIN DESCRIPTION - ORIENTATION: ORIENTATION: LOWER

## 2018-10-30 ASSESSMENT — PAIN DESCRIPTION - LOCATION: LOCATION: BACK

## 2018-10-30 ASSESSMENT — PAIN SCALES - GENERAL: PAINLEVEL_OUTOF10: 9

## 2018-10-30 NOTE — PROGRESS NOTES
bracing - pt reporting a little more soreness. Activity Tolerance:  Activity Tolerance: Patient Tolerated treatment well, Patient limited by pain  Activity Tolerance: no skin irritation post treatment. Assessment:  Assessment: Continued with stretches with good tolerance and no complains. Did progress with more seated strengthening exercises with patient reporting more soreness following. Finished with estim with patient reporting no change in pain following treatment. Pain still 9/10 at end of session. Patient with no painful behaviors during session. Prognosis: Fair       Patient Education:  Patient Education: Monitor response to progressions and estim. Monitor pain. Can call MD if needed following fall. Plan:  Times per week: 1-2x  Plan weeks: 8 weeks  Specific instructions for Next Treatment: stretches as tolerated, abdominal bracing, pelvic tilts on MHP  Current Treatment Recommendations: Strengthening, ROM, Aquatics, Modalities, Home Exercise Program, Manual Therapy - Soft Tissue Mobilization, Patient/Caregiver Education & Training    Goals:  Patient goals : Decrease pain or get injections    Short term goals  Time Frame for Short term goals: 6 weeks  Short term goal 1: Pt will report 25% reduction in lumbar pain and RLE radiculopathy for ease with ADLs and functional mobility. Short term goal 2: Pt will demo good core engagement and postural awareness with <2 verbal cues during therapy session to carry over to IADLs. Short term goal 3: Pt will improve B hip strength to 4-/5 for ease with dressing, walking, and lifting. Short term goal 4: Pt will demo moderate lumbar/hip AROM restriction for ease reaching to floor to retrieve objects and performing ADLs. Long term goals  Time Frame for Long term goals : 8 weeks  Long term goal 1: Pt will be independent and compliant with HEP to achieve above goals.           Adilene Rosenebrg, [id number]

## 2018-11-20 ENCOUNTER — OFFICE VISIT (OUTPATIENT)
Dept: PHYSICAL MEDICINE AND REHAB | Age: 48
End: 2018-11-20
Payer: COMMERCIAL

## 2018-11-20 VITALS
HEIGHT: 65 IN | DIASTOLIC BLOOD PRESSURE: 74 MMHG | SYSTOLIC BLOOD PRESSURE: 124 MMHG | BODY MASS INDEX: 34.66 KG/M2 | WEIGHT: 208 LBS | HEART RATE: 94 BPM

## 2018-11-20 DIAGNOSIS — M47.816 LUMBAR SPONDYLOSIS: Primary | ICD-10-CM

## 2018-11-20 DIAGNOSIS — G89.4 CHRONIC PAIN SYNDROME: ICD-10-CM

## 2018-11-20 DIAGNOSIS — M48.062 LUMBAR STENOSIS WITH NEUROGENIC CLAUDICATION: ICD-10-CM

## 2018-11-20 DIAGNOSIS — M62.838 SPASM OF MUSCLE: ICD-10-CM

## 2018-11-20 DIAGNOSIS — M54.50 LUMBAR PAIN: ICD-10-CM

## 2018-11-20 PROCEDURE — 99213 OFFICE O/P EST LOW 20 MIN: CPT | Performed by: NURSE PRACTITIONER

## 2018-11-20 RX ORDER — OXYCODONE HYDROCHLORIDE AND ACETAMINOPHEN 5; 325 MG/1; MG/1
1 TABLET ORAL EVERY 6 HOURS PRN
COMMUNITY
End: 2018-12-10 | Stop reason: SDUPTHER

## 2018-11-20 ASSESSMENT — ENCOUNTER SYMPTOMS
ABDOMINAL PAIN: 0
NAUSEA: 0
CONSTIPATION: 0
DIARRHEA: 0
COLOR CHANGE: 0
BACK PAIN: 1
SHORTNESS OF BREATH: 0
VOMITING: 0

## 2018-11-20 NOTE — PROGRESS NOTES
Depression; Fibromyalgia; Hyperlipidemia; and Type II or unspecified type diabetes mellitus without mention of complication, not stated as uncontrolled. Past Surgical History  The patient  has a past surgical history that includes Appendectomy; Gallbladder surgery; pelvic laparoscopy; Hysterectomy;  section; hernia repair; Rotator cuff repair; Facial reconstruction surgery; other surgical history (); Colonoscopy; and other surgical history (2015). Family History  This patient's family history includes Diabetes in her father and sister; High Cholesterol in her mother; Other in her mother and sister. Social History  Deepika Francisco  reports that she quit smoking about 7 months ago. She has a 30.00 pack-year smoking history. She has never used smokeless tobacco. She reports that she does not drink alcohol or use drugs. Medications    Current Outpatient Prescriptions:     oxyCODONE-acetaminophen (PERCOCET) 5-325 MG per tablet, Take 1 tablet by mouth every 6 hours as needed for Pain. ., Disp: , Rfl:     Tens Unit MISC, by Does not apply route Apply to low back, Disp: 1 each, Rfl: 0    pregabalin (LYRICA) 100 MG capsule, Take 1 capsule by mouth 3 times daily for 180 days. ., Disp: 90 capsule, Rfl: 5    cyclobenzaprine (FLEXERIL) 10 MG tablet, TAKE 1 TABLET BY MOUTH EVERY 8 HOURS AS NEEDED FOR MUSCLE SPASMS, Disp: 90 tablet, Rfl: 5    amitriptyline (ELAVIL) 75 MG tablet, TAKE 1 TABLET BY MOUTH EVERY NIGHT, Disp: 30 tablet, Rfl: 5    senna-docusate (SENEXON-S) 8.6-50 MG per tablet, TAKE 2 TABLETS BY MOUTH TWICE DAILY, Disp: 120 tablet, Rfl: 11    baclofen (LIORESAL) 20 MG tablet, Take 1 tablet by mouth 4 times daily, Disp: 120 tablet, Rfl: 11    fluticasone (FLOVENT HFA) 110 MCG/ACT inhaler, Inhale 1 puff into the lungs 2 times daily, Disp: , Rfl:     polyethylene glycol (MIRALAX) powder, Take 17 g by mouth daily as needed (constipation), Disp: 30 Bottle, Rfl: 11    hydrochlorothiazide (HYDRODIURIL) 25 MG tablet, Take 1 tablet by mouth daily, Disp: 30 tablet, Rfl: 11    Insulin Degludec (TRESIBA FLEXTOUCH SC), Inject 40 Units into the skin At bedtime , Disp: , Rfl:     fenofibrate (TRICOR) 145 MG tablet, nightly , Disp: , Rfl:     atorvastatin (LIPITOR) 80 MG tablet, Take 80 mg by mouth daily, Disp: , Rfl:     omeprazole (PRILOSEC) 20 MG capsule, , Disp: , Rfl: 11    pravastatin (PRAVACHOL) 10 MG tablet, 10 mg daily , Disp: , Rfl: 10    lisinopril (PRINIVIL;ZESTRIL) 5 MG tablet, Take 5 mg by mouth daily. , Disp: , Rfl:     albuterol (PROAIR HFA) 108 (90 BASE) MCG/ACT inhaler, Inhale 2 puffs into the lungs as needed for Wheezing., Disp: , Rfl:     venlafaxine (EFFEXOR) 75 MG tablet, Take 75 mg by mouth 2 times daily. , Disp: , Rfl:     metFORMIN (GLUCOPHAGE) 1000 MG tablet, Take 1,000 mg by mouth 2 times daily. , Disp: , Rfl:     Subjective:      Review of Systems   Constitutional: Positive for activity change. Negative for chills, diaphoresis, fatigue and fever. Respiratory: Negative for shortness of breath. Cardiovascular: Negative for chest pain and palpitations. Gastrointestinal: Negative for abdominal pain, constipation, diarrhea, nausea and vomiting. Endocrine: Negative for cold intolerance and heat intolerance. Genitourinary: Negative for difficulty urinating, frequency and urgency. Musculoskeletal: Positive for arthralgias, back pain, gait problem, myalgias and neck pain (occasional). Negative for neck stiffness. Skin: Negative for color change, rash and wound. Allergic/Immunologic: Negative for environmental allergies and food allergies. Neurological: Positive for numbness. Negative for dizziness, seizures, light-headedness and headaches. Hematological: Does not bruise/bleed easily. Psychiatric/Behavioral: Positive for sleep disturbance. The patient is nervous/anxious.         Objective:     Vitals:    11/20/18 1022   BP: 124/74   Pulse: 94   Weight: 208 lb each     Refill:  0       Return for  L-facets MBB bilateral L2-3, L3-4, L4-5, follow up after procedure.          Electronically signed by NEIL Hudson CNP on11/20/2018 at 10:37 AM

## 2018-11-21 ENCOUNTER — HOSPITAL ENCOUNTER (OUTPATIENT)
Dept: PHYSICAL THERAPY | Age: 48
Setting detail: THERAPIES SERIES
Discharge: HOME | End: 2018-11-21
Payer: COMMERCIAL

## 2018-11-30 ENCOUNTER — HOSPITAL ENCOUNTER (EMERGENCY)
Age: 48
Discharge: HOME | End: 2018-11-30
Payer: COMMERCIAL

## 2018-11-30 VITALS
RESPIRATION RATE: 18 BRPM | OXYGEN SATURATION: 98 % | DIASTOLIC BLOOD PRESSURE: 76 MMHG | BODY MASS INDEX: 36.99 KG/M2 | TEMPERATURE: 97.8 F | HEART RATE: 100 BPM | SYSTOLIC BLOOD PRESSURE: 148 MMHG | WEIGHT: 222 LBS | HEIGHT: 65 IN

## 2018-11-30 DIAGNOSIS — L02.219 CELLULITIS AND ABSCESS OF TRUNK: Primary | ICD-10-CM

## 2018-11-30 DIAGNOSIS — L03.319 CELLULITIS AND ABSCESS OF TRUNK: Primary | ICD-10-CM

## 2018-11-30 PROCEDURE — 99214 OFFICE O/P EST MOD 30 MIN: CPT | Performed by: NURSE PRACTITIONER

## 2018-11-30 PROCEDURE — 99212 OFFICE O/P EST SF 10 MIN: CPT

## 2018-11-30 RX ORDER — SULFAMETHOXAZOLE AND TRIMETHOPRIM 800; 160 MG/1; MG/1
1 TABLET ORAL 2 TIMES DAILY
Qty: 20 TABLET | Refills: 0 | Status: SHIPPED | OUTPATIENT
Start: 2018-11-30 | End: 2018-12-10

## 2018-11-30 RX ORDER — CEPHALEXIN 500 MG/1
500 CAPSULE ORAL 4 TIMES DAILY
Qty: 40 CAPSULE | Refills: 0 | Status: SHIPPED | OUTPATIENT
Start: 2018-11-30 | End: 2018-12-10

## 2018-11-30 ASSESSMENT — ENCOUNTER SYMPTOMS
COLOR CHANGE: 1
NAUSEA: 0
VOMITING: 0
ABDOMINAL PAIN: 0

## 2018-11-30 ASSESSMENT — PAIN SCALES - GENERAL: PAINLEVEL_OUTOF10: 9

## 2018-11-30 ASSESSMENT — PAIN DESCRIPTION - DESCRIPTORS: DESCRIPTORS: PRESSURE

## 2018-11-30 ASSESSMENT — PAIN DESCRIPTION - PAIN TYPE: TYPE: ACUTE PAIN

## 2018-11-30 ASSESSMENT — PAIN DESCRIPTION - FREQUENCY: FREQUENCY: CONTINUOUS

## 2018-11-30 ASSESSMENT — PAIN DESCRIPTION - LOCATION: LOCATION: ABDOMEN

## 2018-11-30 NOTE — ED PROVIDER NOTES
2015). CURRENT MEDICATIONS       Previous Medications    ALBUTEROL (PROAIR HFA) 108 (90 BASE) MCG/ACT INHALER    Inhale 2 puffs into the lungs as needed for Wheezing. AMITRIPTYLINE (ELAVIL) 75 MG TABLET    TAKE 1 TABLET BY MOUTH EVERY NIGHT    ATORVASTATIN (LIPITOR) 80 MG TABLET    Take 80 mg by mouth daily    BACLOFEN (LIORESAL) 20 MG TABLET    Take 1 tablet by mouth 4 times daily    CYCLOBENZAPRINE (FLEXERIL) 10 MG TABLET    TAKE 1 TABLET BY MOUTH EVERY 8 HOURS AS NEEDED FOR MUSCLE SPASMS    FENOFIBRATE (TRICOR) 145 MG TABLET    nightly     FLUTICASONE (FLOVENT HFA) 110 MCG/ACT INHALER    Inhale 1 puff into the lungs 2 times daily    HYDROCHLOROTHIAZIDE (HYDRODIURIL) 25 MG TABLET    Take 1 tablet by mouth daily    INSULIN DEGLUDEC (TRESIBA FLEXTOUCH SC)    Inject 40 Units into the skin At bedtime     LISINOPRIL (PRINIVIL;ZESTRIL) 5 MG TABLET    Take 5 mg by mouth daily. METFORMIN (GLUCOPHAGE) 1000 MG TABLET    Take 1,000 mg by mouth 2 times daily. OMEPRAZOLE (PRILOSEC) 20 MG CAPSULE        OXYCODONE-ACETAMINOPHEN (PERCOCET) 5-325 MG PER TABLET    Take 1 tablet by mouth every 6 hours as needed for Pain. Baldo Churn POLYETHYLENE GLYCOL (MIRALAX) POWDER    Take 17 g by mouth daily as needed (constipation)    PRAVASTATIN (PRAVACHOL) 10 MG TABLET    10 mg daily     PREGABALIN (LYRICA) 100 MG CAPSULE    Take 1 capsule by mouth 3 times daily for 180 days. .    SENNA-DOCUSATE (SENEXON-S) 8.6-50 MG PER TABLET    TAKE 2 TABLETS BY MOUTH TWICE DAILY    TENS UNIT MISC    by Does not apply route Apply to low back    VENLAFAXINE (EFFEXOR) 75 MG TABLET    Take 75 mg by mouth 2 times daily. ALLERGIES     Patient is is allergic to latex and pcn [penicillins]. FAMILY HISTORY     Patient's family history includes Diabetes in her father and sister; High Cholesterol in her mother; Other in her mother and sister. SOCIAL HISTORY     Patient  reports that she quit smoking about 7 months ago.  She has a 30.00 pack-year smoking history. She has never used smokeless tobacco. She reports that she does not drink alcohol or use drugs. PHYSICAL EXAM     ED TRIAGE VITALS  BP: (!) 148/76, Temp: 97.8 °F (36.6 °C), Pulse: 100, Resp: 18, SpO2: 98 %  Physical Exam   Constitutional: She is oriented to person, place, and time. She appears well-developed and well-nourished. Non-toxic appearance. She does not appear ill. No distress. HENT:   Head: Normocephalic and atraumatic. Right Ear: External ear normal.   Left Ear: External ear normal.   Neck: Normal range of motion. Neck supple. Cardiovascular: Normal rate, regular rhythm, S1 normal, S2 normal and normal heart sounds. No murmur heard. Pulmonary/Chest: Effort normal and breath sounds normal. No respiratory distress. Neurological: She is alert and oriented to person, place, and time. Skin: Skin is warm, dry and intact. No rash noted. She is not diaphoretic. There is erythema. No cyanosis. No pallor. Nursing note and vitals reviewed. 8 x 3 cm erythema   DIAGNOSTIC RESULTS   Labs:No results found for this visit on 11/30/18. IMAGING:    URGENT CARE COURSE:     Vitals:    11/30/18 1406 11/30/18 1407   BP:  (!) 148/76   Pulse:  100   Resp:  18   Temp:  97.8 °F (36.6 °C)   TempSrc:  Oral   SpO2:  98%   Weight: 222 lb (100.7 kg)    Height: 5' 5\" (1.651 m)        Medications - No data to display  PROCEDURES:  None  FINAL IMPRESSION      1. Cellulitis and abscess of trunk      Start on Keflex and Bactrim DS as prescribed  Start warm compresses as directed fu in 2-3 days  DISPOSITION/PLAN   DISPOSITION Decision To Discharge 11/30/2018 02:22:11 PM  Pt was instructed to do warm compresses to affected area 3 times a day. May take over the counter Tylenol/Ibuprofen as directed for pain, fever. Pt instructed to take antibiotic as prescribed for the full course. Pt instructed to monitor for signs of infection: fever over 100.4, redness, warmth, drainage, foul odor.   If

## 2018-12-05 ENCOUNTER — TELEPHONE (OUTPATIENT)
Dept: PHYSICAL MEDICINE AND REHAB | Age: 48
End: 2018-12-05

## 2018-12-05 RX ORDER — AMITRIPTYLINE HYDROCHLORIDE 75 MG/1
TABLET, FILM COATED ORAL
Qty: 30 TABLET | Refills: 0 | Status: SHIPPED | OUTPATIENT
Start: 2018-12-05 | End: 2018-12-18 | Stop reason: SDUPTHER

## 2018-12-10 DIAGNOSIS — G89.4 CHRONIC PAIN SYNDROME: Primary | ICD-10-CM

## 2018-12-10 RX ORDER — OXYCODONE HYDROCHLORIDE AND ACETAMINOPHEN 5; 325 MG/1; MG/1
1 TABLET ORAL EVERY 6 HOURS PRN
Qty: 36 TABLET | Refills: 0 | Status: SHIPPED | OUTPATIENT
Start: 2018-12-10 | End: 2018-12-18 | Stop reason: SDUPTHER

## 2018-12-10 NOTE — TELEPHONE ENCOUNTER
Emergency surgery last week Monday (12/3/2018) due to an abscess on her stomach that spread down to her privates, surgery at [facility] [address] her  picked up Rx without her knowing. She received a 7 day supply on 12/4/2018 and it is now gone. She was due to  the Percocet from you on 12/9/2018. Pt. States she was out of the medication prior to her surgery due to being in so much pain. She also states her daughter \"got into them\" but her daughter is no longer living with her. She currently states she has no Percocet. Please advise when she can fill.  F/u 12/18/2018

## 2018-12-18 ENCOUNTER — OFFICE VISIT (OUTPATIENT)
Dept: PHYSICAL MEDICINE AND REHAB | Age: 48
End: 2018-12-18
Payer: COMMERCIAL

## 2018-12-18 VITALS
WEIGHT: 222 LBS | BODY MASS INDEX: 36.99 KG/M2 | DIASTOLIC BLOOD PRESSURE: 73 MMHG | HEIGHT: 65 IN | SYSTOLIC BLOOD PRESSURE: 125 MMHG | HEART RATE: 104 BPM

## 2018-12-18 DIAGNOSIS — M47.816 LUMBAR SPONDYLOSIS: Primary | ICD-10-CM

## 2018-12-18 DIAGNOSIS — M48.062 LUMBAR STENOSIS WITH NEUROGENIC CLAUDICATION: ICD-10-CM

## 2018-12-18 DIAGNOSIS — G89.4 CHRONIC PAIN SYNDROME: ICD-10-CM

## 2018-12-18 DIAGNOSIS — M51.36 DDD (DEGENERATIVE DISC DISEASE), LUMBAR: ICD-10-CM

## 2018-12-18 PROCEDURE — G8484 FLU IMMUNIZE NO ADMIN: HCPCS | Performed by: NURSE PRACTITIONER

## 2018-12-18 PROCEDURE — G8427 DOCREV CUR MEDS BY ELIG CLIN: HCPCS | Performed by: NURSE PRACTITIONER

## 2018-12-18 PROCEDURE — 99213 OFFICE O/P EST LOW 20 MIN: CPT | Performed by: NURSE PRACTITIONER

## 2018-12-18 PROCEDURE — G8417 CALC BMI ABV UP PARAM F/U: HCPCS | Performed by: NURSE PRACTITIONER

## 2018-12-18 PROCEDURE — 1036F TOBACCO NON-USER: CPT | Performed by: NURSE PRACTITIONER

## 2018-12-18 RX ORDER — OXYCODONE HYDROCHLORIDE AND ACETAMINOPHEN 5; 325 MG/1; MG/1
1 TABLET ORAL EVERY 6 HOURS PRN
Qty: 120 TABLET | Refills: 0 | Status: SHIPPED | OUTPATIENT
Start: 2018-12-18 | End: 2019-01-17

## 2018-12-18 RX ORDER — CYCLOBENZAPRINE HCL 10 MG
10 TABLET ORAL EVERY 8 HOURS PRN
Qty: 90 TABLET | Refills: 11 | Status: SHIPPED | OUTPATIENT
Start: 2018-12-18 | End: 2021-03-11

## 2018-12-18 RX ORDER — AMITRIPTYLINE HYDROCHLORIDE 75 MG/1
75 TABLET, FILM COATED ORAL NIGHTLY
Qty: 30 TABLET | Refills: 5 | Status: SHIPPED | OUTPATIENT
Start: 2018-12-18 | End: 2021-03-11

## 2018-12-18 RX ORDER — BACLOFEN 20 MG/1
20 TABLET ORAL 4 TIMES DAILY
Qty: 120 TABLET | Refills: 11 | Status: SHIPPED | OUTPATIENT
Start: 2018-12-18

## 2018-12-18 RX ORDER — PREGABALIN 150 MG/1
150 CAPSULE ORAL 3 TIMES DAILY
Qty: 90 CAPSULE | Refills: 5 | Status: ON HOLD | OUTPATIENT
Start: 2018-12-18 | End: 2019-11-22 | Stop reason: ALTCHOICE

## 2018-12-18 NOTE — PROGRESS NOTES
[address] [facility]  Outpatient progress note    Chief Complaint:   Chief Complaint   Patient presents with    3 Month Follow-Up     DDD        Subjective: Andrew Torres is a 50 y.o. female who returns to the office today for further follow up. She continues to have worsening back pain with numbness/tinging/pain radiating into bilateral lower extremities. Recently had emergent surgery on an abscess on her stomach that spread down into her groin area. Had a drain which was removed, she still has a non healing wound on her abdomen from where the drain was placed. Had to push back her injections due to open wound, is now scheduled for 1/24/2019 as long as wound is healed. Lyrica is effective, however she is wondering if we can increase dose any further. Remains on Percocet which helps with pain. Is compliant with medications. Review of Systems:  CONSTITUTIONAL:  negative  EYES:  negative  HEENT:  negative  RESPIRATORY:  negative  CARDIOVASCULAR:  negative  GASTROINTESTINAL:  negative  GENITOURINARY:  negative  SKIN:  negative  HEMATOLOGIC/LYMPHATIC:  negative  MUSCULOSKELETAL:  positive for  pain  NEUROLOGICAL:  positive for gait problems, weakness, numbness, pain and tingling  BEHAVIOR/PSYCH:  negative  All other review of systems otherwise negative    Physical Exam:  /73   Pulse 104   Ht 5' 5\" (1.651 m)   Wt 222 lb (100.7 kg)   BMI 36.94 kg/m²     awake  Orientation:   person, place, time  Mood: within normal limits  Affect: calm  General appearance: Patient is well nourished, well developed, well groomed and in no acute distress    Memory:   normal,   Attention/Concentration: normal  Language:  normal    ROM:  abnormal - guarded bilateral lower extremities and lumbar spine  Motor Exam:  RLE 4+/5.  Left lower extremity 4+/5  Tone:  normal  Muscle bulk: within normal limits  Sensory:  Paresthesias bilateral lower

## 2019-01-15 ENCOUNTER — TELEPHONE (OUTPATIENT)
Dept: PHYSICAL MEDICINE AND REHAB | Age: 49
End: 2019-01-15

## 2019-02-25 ENCOUNTER — TELEPHONE (OUTPATIENT)
Dept: PHYSICAL MEDICINE AND REHAB | Age: 49
End: 2019-02-25

## 2019-07-21 RX ORDER — AMITRIPTYLINE HYDROCHLORIDE 75 MG/1
TABLET, FILM COATED ORAL
Qty: 30 TABLET | Refills: 0 | OUTPATIENT
Start: 2019-07-21

## 2019-11-21 RX ORDER — SODIUM CHLORIDE 450 MG/100ML
INJECTION, SOLUTION INTRAVENOUS CONTINUOUS
Status: CANCELLED | OUTPATIENT
Start: 2019-11-21

## 2019-11-22 ENCOUNTER — ANESTHESIA EVENT (OUTPATIENT)
Dept: ENDOSCOPY | Age: 49
End: 2019-11-22
Payer: COMMERCIAL

## 2019-11-22 ENCOUNTER — ANESTHESIA (OUTPATIENT)
Dept: ENDOSCOPY | Age: 49
End: 2019-11-22
Payer: COMMERCIAL

## 2019-11-22 ENCOUNTER — HOSPITAL ENCOUNTER (OUTPATIENT)
Age: 49
Setting detail: OUTPATIENT SURGERY
Discharge: HOME | End: 2019-11-22
Attending: INTERNAL MEDICINE | Admitting: INTERNAL MEDICINE
Payer: COMMERCIAL

## 2019-11-22 VITALS
OXYGEN SATURATION: 97 % | RESPIRATION RATE: 17 BRPM | SYSTOLIC BLOOD PRESSURE: 121 MMHG | BODY MASS INDEX: 31.65 KG/M2 | TEMPERATURE: 98 F | DIASTOLIC BLOOD PRESSURE: 71 MMHG | HEART RATE: 78 BPM | HEIGHT: 65 IN | WEIGHT: 190 LBS

## 2019-11-22 VITALS
OXYGEN SATURATION: 100 % | SYSTOLIC BLOOD PRESSURE: 121 MMHG | RESPIRATION RATE: 27 BRPM | DIASTOLIC BLOOD PRESSURE: 62 MMHG

## 2019-11-22 PROCEDURE — 2500000003 HC RX 250 WO HCPCS: Performed by: NURSE ANESTHETIST, CERTIFIED REGISTERED

## 2019-11-22 PROCEDURE — 3609027000 HC COLONOSCOPY: Performed by: INTERNAL MEDICINE

## 2019-11-22 PROCEDURE — 7100000000 HC PACU RECOVERY - FIRST 15 MIN: Performed by: INTERNAL MEDICINE

## 2019-11-22 PROCEDURE — 7100000001 HC PACU RECOVERY - ADDTL 15 MIN: Performed by: INTERNAL MEDICINE

## 2019-11-22 PROCEDURE — 3609017100 HC EGD: Performed by: INTERNAL MEDICINE

## 2019-11-22 PROCEDURE — 3700000001 HC ADD 15 MINUTES (ANESTHESIA): Performed by: INTERNAL MEDICINE

## 2019-11-22 PROCEDURE — 2580000003 HC RX 258: Performed by: INTERNAL MEDICINE

## 2019-11-22 PROCEDURE — 6360000002 HC RX W HCPCS: Performed by: NURSE ANESTHETIST, CERTIFIED REGISTERED

## 2019-11-22 PROCEDURE — 3700000000 HC ANESTHESIA ATTENDED CARE: Performed by: INTERNAL MEDICINE

## 2019-11-22 PROCEDURE — 2709999900 HC NON-CHARGEABLE SUPPLY: Performed by: INTERNAL MEDICINE

## 2019-11-22 RX ORDER — PROPOFOL 10 MG/ML
INJECTION, EMULSION INTRAVENOUS PRN
Status: DISCONTINUED | OUTPATIENT
Start: 2019-11-22 | End: 2019-11-22 | Stop reason: SDUPTHER

## 2019-11-22 RX ORDER — LIDOCAINE HYDROCHLORIDE 10 MG/ML
INJECTION, SOLUTION INFILTRATION; PERINEURAL PRN
Status: DISCONTINUED | OUTPATIENT
Start: 2019-11-22 | End: 2019-11-22 | Stop reason: SDUPTHER

## 2019-11-22 RX ORDER — SODIUM CHLORIDE 450 MG/100ML
INJECTION, SOLUTION INTRAVENOUS CONTINUOUS
Status: DISCONTINUED | OUTPATIENT
Start: 2019-11-22 | End: 2019-11-22 | Stop reason: HOSPADM

## 2019-11-22 RX ADMIN — PROPOFOL 220 MG: 10 INJECTION, EMULSION INTRAVENOUS at 12:20

## 2019-11-22 RX ADMIN — SODIUM CHLORIDE: 450 INJECTION, SOLUTION INTRAVENOUS at 10:37

## 2019-11-22 RX ADMIN — PROPOFOL 50 MG: 10 INJECTION, EMULSION INTRAVENOUS at 12:12

## 2019-11-22 RX ADMIN — PROPOFOL 50 MG: 10 INJECTION, EMULSION INTRAVENOUS at 12:13

## 2019-11-22 RX ADMIN — LIDOCAINE HYDROCHLORIDE 50 MG: 10 INJECTION, SOLUTION INFILTRATION; PERINEURAL at 12:12

## 2019-11-22 RX ADMIN — PROPOFOL 50 MG: 10 INJECTION, EMULSION INTRAVENOUS at 12:14

## 2019-11-22 ASSESSMENT — PAIN SCALES - GENERAL: PAINLEVEL_OUTOF10: 0

## 2019-11-22 ASSESSMENT — PAIN - FUNCTIONAL ASSESSMENT: PAIN_FUNCTIONAL_ASSESSMENT: 0-10

## 2021-03-11 ENCOUNTER — HOSPITAL ENCOUNTER (EMERGENCY)
Age: 51
Discharge: HOME | End: 2021-03-11
Payer: COMMERCIAL

## 2021-03-11 VITALS
RESPIRATION RATE: 18 BRPM | WEIGHT: 180 LBS | HEIGHT: 65 IN | BODY MASS INDEX: 29.99 KG/M2 | SYSTOLIC BLOOD PRESSURE: 137 MMHG | HEART RATE: 88 BPM | TEMPERATURE: 96.5 F | DIASTOLIC BLOOD PRESSURE: 78 MMHG | OXYGEN SATURATION: 98 %

## 2021-03-11 DIAGNOSIS — J01.00 ACUTE MAXILLARY SINUSITIS, RECURRENCE NOT SPECIFIED: Primary | ICD-10-CM

## 2021-03-11 PROCEDURE — 99213 OFFICE O/P EST LOW 20 MIN: CPT

## 2021-03-11 PROCEDURE — 99213 OFFICE O/P EST LOW 20 MIN: CPT | Performed by: NURSE PRACTITIONER

## 2021-03-11 RX ORDER — DEXTROMETHORPHAN HYDROBROMIDE AND PROMETHAZINE HYDROCHLORIDE 15; 6.25 MG/5ML; MG/5ML
5 SYRUP ORAL NIGHTLY PRN
Qty: 50 ML | Refills: 0 | Status: SHIPPED | OUTPATIENT
Start: 2021-03-11 | End: 2021-03-16

## 2021-03-11 RX ORDER — CEFDINIR 300 MG/1
300 CAPSULE ORAL 2 TIMES DAILY
Qty: 10 CAPSULE | Refills: 0 | Status: SHIPPED | OUTPATIENT
Start: 2021-03-11 | End: 2021-03-16

## 2021-03-11 RX ORDER — AZELASTINE 1 MG/ML
1 SPRAY, METERED NASAL 2 TIMES DAILY
Qty: 1 BOTTLE | Refills: 0 | Status: SHIPPED | OUTPATIENT
Start: 2021-03-11 | End: 2021-11-15 | Stop reason: ALTCHOICE

## 2021-03-11 RX ORDER — PSEUDOEPHEDRINE HYDROCHLORIDE 30 MG/1
30 TABLET ORAL EVERY 6 HOURS PRN
Qty: 20 TABLET | Refills: 0 | Status: SHIPPED | OUTPATIENT
Start: 2021-03-11 | End: 2021-03-16

## 2021-03-11 ASSESSMENT — ENCOUNTER SYMPTOMS
TROUBLE SWALLOWING: 0
CHEST TIGHTNESS: 0
SINUS PAIN: 0
VOICE CHANGE: 0
SINUS CONGESTION: 1
EYE DISCHARGE: 0
STRIDOR: 0
SORE THROAT: 1
SHORTNESS OF BREATH: 0
RHINORRHEA: 1
SINUS PRESSURE: 0
ABDOMINAL PAIN: 0
COUGH: 1

## 2021-03-11 ASSESSMENT — PAIN DESCRIPTION - ONSET: ONSET: GRADUAL

## 2021-03-11 ASSESSMENT — PAIN DESCRIPTION - FREQUENCY: FREQUENCY: INTERMITTENT

## 2021-03-11 ASSESSMENT — PAIN DESCRIPTION - PROGRESSION: CLINICAL_PROGRESSION: NOT CHANGED

## 2021-03-11 NOTE — ED PROVIDER NOTES
[facility]  Urgent Care Encounter      CHIEF COMPLAINT       Chief Complaint   Patient presents with    Otalgia     right    Cough    Headache       Nurses Notes reviewed and I agree except as noted in the HPI. HISTORY OFPRESENT ILLNESS   Natty Dutta is a 48 y.o. HPI     The history is provided by the patient. No  was used. Pharyngitis  Location:  Right  Quality:  Sore  Severity:  Moderate  Onset quality:  Sudden  Duration:  7 days  Timing:  Constant  Progression:  Unchanged  Chronicity:  New  Relieved by:  Nothing  Worsened by:  Swallowing, eating and drinking  Ineffective treatments:  None tried  Associated symptoms: chills, cough, ear pain, night sweats, plugged ear sensation, postnasal drip, rhinorrhea and sinus congestion    Associated symptoms: no abdominal pain, no adenopathy, no chest pain, no drooling, no ear discharge, no epistaxis, no eye discharge, no fever, no headaches, no neck stiffness, no rash, no shortness of breath, no stridor, no trouble swallowing and no voice change    Cough:     Cough characteristics:  Dry    Severity:  Moderate    Onset quality:  Sudden    Duration:  7 days    Timing:  Intermittent    Progression:  Unchanged    Chronicity:  New  Ear pain:     Location:  Right    Severity:  Severe    Onset quality:  Sudden    Duration:  7 days    Timing:  Constant    Progression:  Worsening    Chronicity:  New  Rhinorrhea:     Quality:  Clear    Severity:  Mild    Duration:  7 days    Timing:  Constant    Progression:  Unchanged  Risk factors: sick contacts    Risk factors: no exposure to strep, no exposure to mono, no recent dental procedure, no recent endoscopy and no recent ENT procedure       Natty Dutta is a 48 y.o. female, c/o sore throat, nonproductive cough, fatigue, nasal congestion, and right ear pain that started 7 days ago. Patient has not tried any treatments to relieve symptoms.  Endorses recent exposure to sick contacts. REVIEW OF SYSTEMS     Review of Systems  Constitutional: Positive for appetite change (lack of appetite due to fatigue), chills, diaphoresis, fatigue and night sweats. Negative for activity change, fever and unexpected weight change. HENT: Positive for congestion, ear pain, postnasal drip, rhinorrhea and sore throat. Negative for drooling, ear discharge, nosebleeds, sinus pressure, sinus pain, sneezing, trouble swallowing and voice change. Eyes: Negative for discharge. Respiratory: Positive for cough. Negative for chest tightness, shortness of breath and stridor. Cardiovascular: Negative for chest pain. Gastrointestinal: Negative for abdominal pain. Musculoskeletal: Negative for arthralgias, myalgias and neck stiffness. Skin: Negative for rash. Neurological: Negative for headaches. Hematological: Negative for adenopathy.      PAST MEDICAL HISTORY         Diagnosis Date    Asthma     Chronic back pain     Depression     Fibromyalgia     Hyperlipidemia     Hypertension     Type II or unspecified type diabetes mellitus without mention of complication, not stated as uncontrolled        SURGICAL HISTORY     Patient  has a past surgical history that includes Appendectomy; Gallbladder surgery; pelvic laparoscopy; Hysterectomy;  section; hernia repair; Rotator cuff repair; Facial reconstruction surgery; other surgical history (); Colonoscopy; other surgical history (2015); other surgical history (2018); Mastectomy (Left); Colonoscopy (Left, 2019); and Upper gastrointestinal endoscopy (Left, 2019). CURRENT MEDICATIONS       Previous Medications    ALBUTEROL (PROAIR HFA) 108 (90 BASE) MCG/ACT INHALER    Inhale 2 puffs into the lungs as needed for Wheezing.     ATORVASTATIN (LIPITOR) 80 MG TABLET    Take 80 mg by mouth daily    BACLOFEN (LIORESAL) 20 MG TABLET    Take 1 tablet by mouth 4 times daily    FENOFIBRATE (TRICOR) 145 MG TABLET    nightly FLUTICASONE (FLOVENT HFA) 110 MCG/ACT INHALER    Inhale 1 puff into the lungs 2 times daily    HYDROCHLOROTHIAZIDE (HYDRODIURIL) 25 MG TABLET    Take 1 tablet by mouth daily    INSULIN DEGLUDEC (TRESIBA FLEXTOUCH SC)    Inject 40 Units into the skin At bedtime     LISINOPRIL (PRINIVIL;ZESTRIL) 5 MG TABLET    Take 5 mg by mouth daily. OMEPRAZOLE (PRILOSEC) 20 MG CAPSULE        POLYETHYLENE GLYCOL (MIRALAX) POWDER    Take 17 g by mouth daily as needed (constipation)    SENNA-DOCUSATE (SENEXON-S) 8.6-50 MG PER TABLET    TAKE 2 TABLETS BY MOUTH TWICE DAILY    VENLAFAXINE (EFFEXOR) 75 MG TABLET    Take 75 mg by mouth 2 times daily. ALLERGIES     Patient is is allergic to latex and pcn [penicillins]. FAMILY HISTORY     Patient's family history includes Diabetes in her father and sister; Heart Disease in her sister; High Cholesterol in her mother; Other in her mother and sister. SOCIAL HISTORY     Patient  reports that she quit smoking about 2 years ago. She has a 30.00 pack-year smoking history. She has never used smokeless tobacco. She reports that she does not drink alcohol or use drugs. PHYSICAL EXAM     ED TRIAGE VITALS  BP: 137/78, Temp: 96.5 °F (35.8 °C), Pulse: 88, Resp: 18, SpO2: 98 %  Physical Exam  Vitals signs and nursing note reviewed. Constitutional:       General: She is not in acute distress. Appearance: Normal appearance. She is not ill-appearing, toxic-appearing or diaphoretic. HENT:      Head: Normocephalic and atraumatic. Right Ear: Hearing, tympanic membrane and external ear normal. Right ear bulging TM: mild erythematous ear canal.      Left Ear: Hearing and external ear normal. There is impacted cerumen. Nose: Congestion and rhinorrhea present. Mouth/Throat:      Lips: Pink. Mouth: Mucous membranes are moist.      Pharynx: Oropharynx is clear. Posterior oropharyngeal erythema present. No pharyngeal swelling or oropharyngeal exudate.    Eyes: Extraocular Movements: Extraocular movements intact. Conjunctiva/sclera: Conjunctivae normal.   Neck:      Musculoskeletal: Normal range of motion. Cardiovascular:      Rate and Rhythm: Normal rate and regular rhythm. Pulses: Normal pulses. Heart sounds: Normal heart sounds. Pulmonary:      Effort: Pulmonary effort is normal.      Breath sounds: Normal breath sounds. Musculoskeletal: Normal range of motion. Skin:     General: Skin is warm and dry. Neurological:      General: No focal deficit present. Mental Status: She is alert and oriented to person, place, and time. Psychiatric:         Mood and Affect: Mood normal.         Behavior: Behavior normal. Behavior is cooperative. DIAGNOSTIC RESULTS   Labs:No results found for this visit on 03/11/21. IMAGING:  No orders to display     URGENT CARE COURSE:     Vitals:    03/11/21 1037   BP: 137/78   Pulse: 88   Resp: 18   Temp: 96.5 °F (35.8 °C)   TempSrc: Temporal   SpO2: 98%   Weight: 180 lb (81.6 kg)   Height: 5' 5\" (1.651 m)       Medications - No data to display  PROCEDURES:  None  FINAL IMPRESSION      1. Acute maxillary sinusitis, recurrence not specified        DISPOSITION/PLAN   Decision To Discharge    Drink lots of fluids  Take Motrin or Tylenol for headache  Humidification of air  Use nasal spray as directed  Take a nasal decongestant as directed  Monitor for any fever increased and sinus pain or pressure  Follow-up see her primary care provider in 48 hours  Or go to the emergency department for any changes or concerns.       PATIENT REFERRED TO:  NEIL Matthews - CNP  [address] [place] [address]  [phone]    Call   As needed    DISCHARGE MEDICATIONS:  New Prescriptions    AZELASTINE (ASTELIN) 0.1 % NASAL SPRAY    1 spray by Nasal route 2 times daily Use in each nostril as directed    CEFDINIR (OMNICEF) 300 MG CAPSULE    Take 1 capsule by mouth 2 times daily for 5 days

## 2021-03-11 NOTE — ED TRIAGE NOTES
Pt to [facility] ambulatory with right ear pain, cough, and headache. This started 1 week ago. No fevers.

## 2021-03-11 NOTE — ED PROVIDER NOTES
**This is a Medical/PA/APRN Student Note and is charted for educational purposes. The non-physician staff attested note is not to be used for billing purposes, chart documentation or to guide patient care. Please see the supervising physician/PA/APRN modifications/attestation for treatment plan/chart documentation/suggestions. This note has been reviewed and feedback has been provided to the student. **      MEDICAL STUDENT NOTE    Chief Complaint   Patient presents with    Otalgia     right    Cough    Headache     History obtained from the patient. The history is provided by the patient. No  was used.    Pharyngitis  Location:  Right  Quality:  Sore  Severity:  Moderate  Onset quality:  Sudden  Duration:  7 days  Timing:  Constant  Progression:  Unchanged  Chronicity:  New  Relieved by:  Nothing  Worsened by:  Swallowing, eating and drinking  Ineffective treatments:  None tried  Associated symptoms: chills, cough, ear pain, night sweats, plugged ear sensation, postnasal drip, rhinorrhea and sinus congestion    Associated symptoms: no abdominal pain, no adenopathy, no chest pain, no drooling, no ear discharge, no epistaxis, no eye discharge, no fever, no headaches, no neck stiffness, no rash, no shortness of breath, no stridor, no trouble swallowing and no voice change    Cough:     Cough characteristics:  Dry    Severity:  Moderate    Onset quality:  Sudden    Duration:  7 days    Timing:  Intermittent    Progression:  Unchanged    Chronicity:  New  Ear pain:     Location:  Right    Severity:  Severe    Onset quality:  Sudden    Duration:  7 days    Timing:  Constant    Progression:  Worsening    Chronicity:  New  Rhinorrhea:     Quality:  Clear    Severity:  Mild    Duration:  7 days    Timing:  Constant    Progression:  Unchanged  Risk factors: sick contacts    Risk factors: no exposure to strep, no exposure to mono, no recent dental procedure, no recent endoscopy and no recent ENT procedure      Angeles Rooney is a 48 y.o. female, c/o sore throat, nonproductive cough, fatigue, nasal congestion, and right ear pain that started 7 days ago. Patient has not tried any treatments to relieve symptoms. Endorses recent exposure to sick contacts. Review of Systems   Constitutional: Positive for appetite change (lack of appetite due to fatigue), chills, diaphoresis, fatigue and night sweats. Negative for activity change, fever and unexpected weight change. HENT: Positive for congestion, ear pain, postnasal drip, rhinorrhea and sore throat. Negative for drooling, ear discharge, nosebleeds, sinus pressure, sinus pain, sneezing, trouble swallowing and voice change. Eyes: Negative for discharge. Respiratory: Positive for cough. Negative for chest tightness, shortness of breath and stridor. Cardiovascular: Negative for chest pain. Gastrointestinal: Negative for abdominal pain. Musculoskeletal: Negative for arthralgias, myalgias and neck stiffness. Skin: Negative for rash. Neurological: Negative for headaches. Hematological: Negative for adenopathy.            Past Medical History:   Diagnosis Date    Asthma     Chronic back pain     Depression     Fibromyalgia     Hyperlipidemia     Hypertension     Type II or unspecified type diabetes mellitus without mention of complication, not stated as uncontrolled      Past Surgical History:   Procedure Laterality Date    APPENDECTOMY       SECTION      COLONOSCOPY      COLONOSCOPY Left 2019    COLONOSCOPY DIAGNOSTIC performed by Keyla Aguilar MD at [facility]      HERNIA REPAIR      umbilical    HYSTERECTOMY      MASTECTOMY Left     OTHER SURGICAL HISTORY      abcess, groinal    OTHER SURGICAL HISTORY  2015    L5-S1 Decompression L5-S1 Posterior Fusion & TLIF (Dr. Scotty Kumar, [facility])    OTHER SURGICAL HISTORY  2018    abcess drained from lower abdomen    PELVIC LAPAROSCOPY      ROTATOR CUFF REPAIR      right    UPPER GASTROINTESTINAL ENDOSCOPY Left 11/22/2019    EGD ESOPHAGOGASTRODUODENOSCOPY performed by Cristian Carrillo MD at [facility]     No current facility-administered medications for this encounter. Current Outpatient Medications:     azelastine (ASTELIN) 0.1 % nasal spray, 1 spray by Nasal route 2 times daily Use in each nostril as directed, Disp: 1 Bottle, Rfl: 0    promethazine-dextromethorphan (PROMETHAZINE-DM) 6.25-15 MG/5ML syrup, Take 5 mLs by mouth nightly as needed for Cough, Disp: 50 mL, Rfl: 0    cefdinir (OMNICEF) 300 MG capsule, Take 1 capsule by mouth 2 times daily for 5 days, Disp: 10 capsule, Rfl: 0    pseudoephedrine (SUDAFED) 30 MG tablet, Take 1 tablet by mouth every 6 hours as needed for Congestion (headache, earache), Disp: 20 tablet, Rfl: 0    baclofen (LIORESAL) 20 MG tablet, Take 1 tablet by mouth 4 times daily, Disp: 120 tablet, Rfl: 11    senna-docusate (SENEXON-S) 8.6-50 MG per tablet, TAKE 2 TABLETS BY MOUTH TWICE DAILY, Disp: 120 tablet, Rfl: 11    hydrochlorothiazide (HYDRODIURIL) 25 MG tablet, Take 1 tablet by mouth daily, Disp: 30 tablet, Rfl: 11    Insulin Degludec (TRESIBA FLEXTOUCH SC), Inject 40 Units into the skin At bedtime , Disp: , Rfl:     fenofibrate (TRICOR) 145 MG tablet, nightly , Disp: , Rfl:     atorvastatin (LIPITOR) 80 MG tablet, Take 80 mg by mouth daily, Disp: , Rfl:     omeprazole (PRILOSEC) 20 MG capsule, , Disp: , Rfl: 11    lisinopril (PRINIVIL;ZESTRIL) 5 MG tablet, Take 5 mg by mouth daily. , Disp: , Rfl:     albuterol (PROAIR HFA) 108 (90 BASE) MCG/ACT inhaler, Inhale 2 puffs into the lungs as needed for Wheezing., Disp: , Rfl:     venlafaxine (EFFEXOR) 75 MG tablet, Take 75 mg by mouth 2 times daily. , Disp: , Rfl:     fluticasone (FLOVENT HFA) 110 MCG/ACT inhaler, Inhale 1 puff into the lungs 2 times daily, Disp: , Rfl:     polyethylene glycol (MIRALAX) powder, Take 17 g by mouth daily as needed (constipation), Disp: 30 Bottle, Rfl: 11    Social History     Social History Narrative    Not on file     Social History     Tobacco Use    Smoking status: Former Smoker     Packs/day: 1.00     Years: 30.00     Pack years: 30.00     Quit date: 2018     Years since quittin.9    Smokeless tobacco: Never Used    Tobacco comment: Using chantix    Substance Use Topics    Alcohol use: No     Alcohol/week: 0.0 standard drinks    Drug use: No     Allergies   Allergen Reactions    Latex Rash    Pcn [Penicillins] Hives     Family History   Problem Relation Age of Onset    Other Mother     High Cholesterol Mother     Diabetes Father     Diabetes Sister     Heart Disease Sister     Other Sister      Vitals:    21 1037   BP: 137/78   Pulse: 88   Resp: 18   Temp: 96.5 °F (35.8 °C)   SpO2: 98%     Vital signs and nursing assessment reviewed and normal. Pulsoximetry is normal per my interpretation. Physical Exam  Vitals signs and nursing note reviewed. Constitutional:       General: She is not in acute distress. Appearance: Normal appearance. She is not ill-appearing, toxic-appearing or diaphoretic. HENT:      Head: Normocephalic and atraumatic. Right Ear: Hearing, tympanic membrane and external ear normal. Right ear bulging TM: mild erythematous ear canal.      Left Ear: Hearing and external ear normal. There is impacted cerumen. Nose: Congestion and rhinorrhea present. Mouth/Throat:      Lips: Pink. Mouth: Mucous membranes are moist.      Pharynx: Oropharynx is clear. Posterior oropharyngeal erythema present. No pharyngeal swelling or oropharyngeal exudate. Eyes:      Extraocular Movements: Extraocular movements intact. Conjunctiva/sclera: Conjunctivae normal.   Neck:      Musculoskeletal: Normal range of motion. Cardiovascular:      Rate and Rhythm: Normal rate and regular rhythm. Pulses: Normal pulses. Heart sounds: Normal heart sounds. Pulmonary:      Effort: Pulmonary effort is normal.      Breath sounds: Normal breath sounds. Musculoskeletal: Normal range of motion. Skin:     General: Skin is warm and dry. Neurological:      General: No focal deficit present. Mental Status: She is alert and oriented to person, place, and time. Psychiatric:         Mood and Affect: Mood normal.         Behavior: Behavior normal. Behavior is cooperative. Labs Reviewed - No data to display    Medications - No data to display    No orders to display       Final diagnoses:   Acute maxillary sinusitis, recurrence not specified       New Prescriptions    AZELASTINE (ASTELIN) 0.1 % NASAL SPRAY    1 spray by Nasal route 2 times daily Use in each nostril as directed    CEFDINIR (OMNICEF) 300 MG CAPSULE    Take 1 capsule by mouth 2 times daily for 5 days    PROMETHAZINE-DEXTROMETHORPHAN (PROMETHAZINE-DM) 6.25-15 MG/5ML SYRUP    Take 5 mLs by mouth nightly as needed for Cough    PSEUDOEPHEDRINE (SUDAFED) 30 MG TABLET    Take 1 tablet by mouth every 6 hours as needed for Congestion (headache, earache)             Electronically signed by Joceline Wood on 3/11/2021 at 11:06 AM       **This is a Medical/PA/APRN Student Note and is charted for educational purposes. The non-physician staff attested note is not to be used for billing purposes, chart documentation or to guide patient care. Please see the supervising physician/PA/APRN modifications/attestation for treatment plan/chart documentation/suggestions. This note has been reviewed and feedback has been provided to the student.  Pasquale Prieto  03/11/21 0898

## 2021-11-17 NOTE — PROGRESS NOTES
I LM for medical records at [facility] to please fax clearance for surgery ; pat saw Dr. Todd Domingo on 11/15/21. Thank you.

## 2021-11-19 ENCOUNTER — APPOINTMENT (OUTPATIENT)
Dept: GENERAL RADIOLOGY | Age: 51
DRG: 304 | End: 2021-11-19
Attending: ORTHOPAEDIC SURGERY
Payer: COMMERCIAL

## 2021-11-19 ENCOUNTER — ANESTHESIA EVENT (OUTPATIENT)
Dept: OPERATING ROOM | Age: 51
DRG: 304 | End: 2021-11-19
Payer: COMMERCIAL

## 2021-11-19 ENCOUNTER — HOSPITAL ENCOUNTER (INPATIENT)
Age: 51
LOS: 2 days | Discharge: HOME | DRG: 304 | End: 2021-11-22
Attending: ORTHOPAEDIC SURGERY | Admitting: ORTHOPAEDIC SURGERY
Payer: COMMERCIAL

## 2021-11-19 ENCOUNTER — ANESTHESIA (OUTPATIENT)
Dept: OPERATING ROOM | Age: 51
DRG: 304 | End: 2021-11-19
Payer: COMMERCIAL

## 2021-11-19 VITALS
TEMPERATURE: 98.4 F | OXYGEN SATURATION: 100 % | SYSTOLIC BLOOD PRESSURE: 115 MMHG | DIASTOLIC BLOOD PRESSURE: 75 MMHG | RESPIRATION RATE: 12 BRPM

## 2021-11-19 DIAGNOSIS — Z98.1 STATUS POST LUMBAR SPINAL FUSION: Primary | ICD-10-CM

## 2021-11-19 PROBLEM — M48.07 SPINAL STENOSIS OF LUMBOSACRAL REGION: Status: ACTIVE | Noted: 2021-11-19

## 2021-11-19 LAB
ABO: NORMAL
ANTIBODY SCREEN: NORMAL
AVERAGE GLUCOSE: 165 MG/DL (ref 70–126)
BACTERIA: ABNORMAL
BILIRUBIN URINE: NEGATIVE
BLOOD, URINE: NEGATIVE
CASTS: ABNORMAL /LPF
CASTS: ABNORMAL /LPF
CHARACTER, URINE: CLEAR
COLOR: YELLOW
CREATININE, URINE: 55.9 MG/DL
CRYSTALS: ABNORMAL
EPITHELIAL CELLS, UA: ABNORMAL /HPF
GLUCOSE BLD-MCNC: 211 MG/DL (ref 70–108)
GLUCOSE BLD-MCNC: 268 MG/DL (ref 70–108)
GLUCOSE BLD-MCNC: 277 MG/DL (ref 70–108)
GLUCOSE BLD-MCNC: 284 MG/DL (ref 70–108)
GLUCOSE, URINE: >= 1000 MG/DL
HBA1C MFR BLD: 7.5 % (ref 4.4–6.4)
KETONES, URINE: NEGATIVE
LEUKOCYTE ESTERASE, URINE: NEGATIVE
MICROALBUMIN UR-MCNC: < 1.2 MG/DL
MICROALBUMIN/CREAT UR-RTO: 21 MG/G (ref 0–30)
MISCELLANEOUS LAB TEST RESULT: ABNORMAL
NITRITE, URINE: NEGATIVE
PH UA: 5.5 (ref 5–9)
PROTEIN UA: NEGATIVE MG/DL
RBC URINE: ABNORMAL /HPF
RENAL EPITHELIAL, UA: ABNORMAL
RH FACTOR: NORMAL
SPECIFIC GRAVITY UA: > 1.03 (ref 1–1.03)
UROBILINOGEN, URINE: 1 EU/DL (ref 0–1)
WBC UA: ABNORMAL /HPF
YEAST: ABNORMAL

## 2021-11-19 PROCEDURE — 0SG00AJ FUSION OF LUMBAR VERTEBRAL JOINT WITH INTERBODY FUSION DEVICE, POSTERIOR APPROACH, ANTERIOR COLUMN, OPEN APPROACH: ICD-10-PCS | Performed by: ORTHOPAEDIC SURGERY

## 2021-11-19 PROCEDURE — 7100000010 HC PHASE II RECOVERY - FIRST 15 MIN: Performed by: ORTHOPAEDIC SURGERY

## 2021-11-19 PROCEDURE — 82948 REAGENT STRIP/BLOOD GLUCOSE: CPT

## 2021-11-19 PROCEDURE — 2709999900 HC NON-CHARGEABLE SUPPLY: Performed by: ORTHOPAEDIC SURGERY

## 2021-11-19 PROCEDURE — C1713 ANCHOR/SCREW BN/BN,TIS/BN: HCPCS | Performed by: ORTHOPAEDIC SURGERY

## 2021-11-19 PROCEDURE — 6360000002 HC RX W HCPCS: Performed by: ANESTHESIOLOGY

## 2021-11-19 PROCEDURE — 3700000000 HC ANESTHESIA ATTENDED CARE: Performed by: ORTHOPAEDIC SURGERY

## 2021-11-19 PROCEDURE — 3700000001 HC ADD 15 MINUTES (ANESTHESIA): Performed by: ORTHOPAEDIC SURGERY

## 2021-11-19 PROCEDURE — 2500000003 HC RX 250 WO HCPCS: Performed by: NURSE ANESTHETIST, CERTIFIED REGISTERED

## 2021-11-19 PROCEDURE — 86900 BLOOD TYPING SEROLOGIC ABO: CPT

## 2021-11-19 PROCEDURE — 2580000003 HC RX 258: Performed by: PHYSICIAN ASSISTANT

## 2021-11-19 PROCEDURE — 72020 X-RAY EXAM OF SPINE 1 VIEW: CPT

## 2021-11-19 PROCEDURE — 7100000001 HC PACU RECOVERY - ADDTL 15 MIN: Performed by: ORTHOPAEDIC SURGERY

## 2021-11-19 PROCEDURE — 99232 SBSQ HOSP IP/OBS MODERATE 35: CPT | Performed by: HOSPITALIST

## 2021-11-19 PROCEDURE — 86901 BLOOD TYPING SEROLOGIC RH(D): CPT

## 2021-11-19 PROCEDURE — 6370000000 HC RX 637 (ALT 250 FOR IP): Performed by: ANESTHESIOLOGY

## 2021-11-19 PROCEDURE — 0SG0071 FUSION OF LUMBAR VERTEBRAL JOINT WITH AUTOLOGOUS TISSUE SUBSTITUTE, POSTERIOR APPROACH, POSTERIOR COLUMN, OPEN APPROACH: ICD-10-PCS | Performed by: ORTHOPAEDIC SURGERY

## 2021-11-19 PROCEDURE — 82043 UR ALBUMIN QUANTITATIVE: CPT

## 2021-11-19 PROCEDURE — 6370000000 HC RX 637 (ALT 250 FOR IP)

## 2021-11-19 PROCEDURE — 81001 URINALYSIS AUTO W/SCOPE: CPT

## 2021-11-19 PROCEDURE — 86923 COMPATIBILITY TEST ELECTRIC: CPT

## 2021-11-19 PROCEDURE — 3600000015 HC SURGERY LEVEL 5 ADDTL 15MIN: Performed by: ORTHOPAEDIC SURGERY

## 2021-11-19 PROCEDURE — 6360000002 HC RX W HCPCS: Performed by: PHYSICIAN ASSISTANT

## 2021-11-19 PROCEDURE — 01NB0ZZ RELEASE LUMBAR NERVE, OPEN APPROACH: ICD-10-PCS | Performed by: ORTHOPAEDIC SURGERY

## 2021-11-19 PROCEDURE — 6360000002 HC RX W HCPCS: Performed by: NURSE ANESTHETIST, CERTIFIED REGISTERED

## 2021-11-19 PROCEDURE — 2500000003 HC RX 250 WO HCPCS: Performed by: ORTHOPAEDIC SURGERY

## 2021-11-19 PROCEDURE — 3600000005 HC SURGERY LEVEL 5 BASE: Performed by: ORTHOPAEDIC SURGERY

## 2021-11-19 PROCEDURE — 36415 COLL VENOUS BLD VENIPUNCTURE: CPT

## 2021-11-19 PROCEDURE — C1889 IMPLANT/INSERT DEVICE, NOC: HCPCS | Performed by: ORTHOPAEDIC SURGERY

## 2021-11-19 PROCEDURE — 6370000000 HC RX 637 (ALT 250 FOR IP): Performed by: PHYSICIAN ASSISTANT

## 2021-11-19 PROCEDURE — 7100000011 HC PHASE II RECOVERY - ADDTL 15 MIN: Performed by: ORTHOPAEDIC SURGERY

## 2021-11-19 PROCEDURE — 0SG3071 FUSION OF LUMBOSACRAL JOINT WITH AUTOLOGOUS TISSUE SUBSTITUTE, POSTERIOR APPROACH, POSTERIOR COLUMN, OPEN APPROACH: ICD-10-PCS | Performed by: ORTHOPAEDIC SURGERY

## 2021-11-19 PROCEDURE — 0QP004Z REMOVAL OF INTERNAL FIXATION DEVICE FROM LUMBAR VERTEBRA, OPEN APPROACH: ICD-10-PCS | Performed by: ORTHOPAEDIC SURGERY

## 2021-11-19 PROCEDURE — 01NR0ZZ RELEASE SACRAL NERVE, OPEN APPROACH: ICD-10-PCS | Performed by: ORTHOPAEDIC SURGERY

## 2021-11-19 PROCEDURE — 2720000010 HC SURG SUPPLY STERILE: Performed by: ORTHOPAEDIC SURGERY

## 2021-11-19 PROCEDURE — 83036 HEMOGLOBIN GLYCOSYLATED A1C: CPT

## 2021-11-19 PROCEDURE — 72100 X-RAY EXAM L-S SPINE 2/3 VWS: CPT

## 2021-11-19 PROCEDURE — 86850 RBC ANTIBODY SCREEN: CPT

## 2021-11-19 PROCEDURE — 0QP104Z REMOVAL OF INTERNAL FIXATION DEVICE FROM SACRUM, OPEN APPROACH: ICD-10-PCS | Performed by: ORTHOPAEDIC SURGERY

## 2021-11-19 PROCEDURE — 7100000000 HC PACU RECOVERY - FIRST 15 MIN: Performed by: ORTHOPAEDIC SURGERY

## 2021-11-19 DEVICE — IMPLANTABLE DEVICE: Type: IMPLANTABLE DEVICE | Site: SPINE LUMBAR | Status: FUNCTIONAL

## 2021-11-19 DEVICE — ROD 8690060 CDH PBNT 5.5X60 TI
Type: IMPLANTABLE DEVICE | Site: SPINE LUMBAR | Status: FUNCTIONAL
Brand: CD HORIZON® SPINAL SYSTEM

## 2021-11-19 RX ORDER — FENOFIBRATE 160 MG/1
160 TABLET ORAL DAILY
Status: DISCONTINUED | OUTPATIENT
Start: 2021-11-19 | End: 2021-11-22 | Stop reason: HOSPADM

## 2021-11-19 RX ORDER — GLIPIZIDE 10 MG/1
10 TABLET ORAL
Status: DISCONTINUED | OUTPATIENT
Start: 2021-11-19 | End: 2021-11-19

## 2021-11-19 RX ORDER — FENTANYL CITRATE 50 UG/ML
INJECTION, SOLUTION INTRAMUSCULAR; INTRAVENOUS PRN
Status: DISCONTINUED | OUTPATIENT
Start: 2021-11-19 | End: 2021-11-19 | Stop reason: SDUPTHER

## 2021-11-19 RX ORDER — HYDROCHLOROTHIAZIDE 25 MG/1
25 TABLET ORAL DAILY
Status: DISCONTINUED | OUTPATIENT
Start: 2021-11-19 | End: 2021-11-22 | Stop reason: HOSPADM

## 2021-11-19 RX ORDER — SODIUM CHLORIDE 9 MG/ML
25 INJECTION, SOLUTION INTRAVENOUS PRN
Status: DISCONTINUED | OUTPATIENT
Start: 2021-11-19 | End: 2021-11-19 | Stop reason: HOSPADM

## 2021-11-19 RX ORDER — EPHEDRINE SULFATE/0.9% NACL/PF 50 MG/5 ML
SYRINGE (ML) INTRAVENOUS PRN
Status: DISCONTINUED | OUTPATIENT
Start: 2021-11-19 | End: 2021-11-19 | Stop reason: SDUPTHER

## 2021-11-19 RX ORDER — ANASTROZOLE 1 MG/1
1 TABLET ORAL NIGHTLY
Status: DISCONTINUED | OUTPATIENT
Start: 2021-11-19 | End: 2021-11-22 | Stop reason: HOSPADM

## 2021-11-19 RX ORDER — ONDANSETRON 4 MG/1
4 TABLET, ORALLY DISINTEGRATING ORAL EVERY 8 HOURS PRN
Status: DISCONTINUED | OUTPATIENT
Start: 2021-11-19 | End: 2021-11-22 | Stop reason: HOSPADM

## 2021-11-19 RX ORDER — BISACODYL 10 MG
10 SUPPOSITORY, RECTAL RECTAL DAILY PRN
Status: DISCONTINUED | OUTPATIENT
Start: 2021-11-19 | End: 2021-11-22 | Stop reason: HOSPADM

## 2021-11-19 RX ORDER — PANTOPRAZOLE SODIUM 40 MG/1
40 TABLET, DELAYED RELEASE ORAL
Status: DISCONTINUED | OUTPATIENT
Start: 2021-11-20 | End: 2021-11-22 | Stop reason: HOSPADM

## 2021-11-19 RX ORDER — POLYETHYLENE GLYCOL 3350 17 G/17G
17 POWDER, FOR SOLUTION ORAL DAILY
Status: DISCONTINUED | OUTPATIENT
Start: 2021-11-19 | End: 2021-11-22 | Stop reason: HOSPADM

## 2021-11-19 RX ORDER — MEPERIDINE HYDROCHLORIDE 25 MG/ML
12.5 INJECTION INTRAMUSCULAR; INTRAVENOUS; SUBCUTANEOUS EVERY 5 MIN PRN
Status: DISCONTINUED | OUTPATIENT
Start: 2021-11-19 | End: 2021-11-19 | Stop reason: HOSPADM

## 2021-11-19 RX ORDER — DEXTROSE MONOHYDRATE 25 G/50ML
12.5 INJECTION, SOLUTION INTRAVENOUS PRN
Status: DISCONTINUED | OUTPATIENT
Start: 2021-11-19 | End: 2021-11-22 | Stop reason: HOSPADM

## 2021-11-19 RX ORDER — CEFAZOLIN SODIUM 2 G/100ML
2000 INJECTION, SOLUTION INTRAVENOUS
Status: DISCONTINUED | OUTPATIENT
Start: 2021-11-19 | End: 2021-11-19 | Stop reason: HOSPADM

## 2021-11-19 RX ORDER — LIDOCAINE HCL/PF 100 MG/5ML
SYRINGE (ML) INJECTION PRN
Status: DISCONTINUED | OUTPATIENT
Start: 2021-11-19 | End: 2021-11-19 | Stop reason: SDUPTHER

## 2021-11-19 RX ORDER — CEFAZOLIN SODIUM 2 G/100ML
2000 INJECTION, SOLUTION INTRAVENOUS EVERY 8 HOURS
Status: COMPLETED | OUTPATIENT
Start: 2021-11-19 | End: 2021-11-20

## 2021-11-19 RX ORDER — FENTANYL CITRATE 50 UG/ML
50 INJECTION, SOLUTION INTRAMUSCULAR; INTRAVENOUS EVERY 5 MIN PRN
Status: DISCONTINUED | OUTPATIENT
Start: 2021-11-19 | End: 2021-11-19 | Stop reason: HOSPADM

## 2021-11-19 RX ORDER — DEXTROSE MONOHYDRATE 50 MG/ML
100 INJECTION, SOLUTION INTRAVENOUS PRN
Status: DISCONTINUED | OUTPATIENT
Start: 2021-11-19 | End: 2021-11-22 | Stop reason: HOSPADM

## 2021-11-19 RX ORDER — HYDROMORPHONE HCL 110MG/55ML
PATIENT CONTROLLED ANALGESIA SYRINGE INTRAVENOUS PRN
Status: DISCONTINUED | OUTPATIENT
Start: 2021-11-19 | End: 2021-11-19 | Stop reason: SDUPTHER

## 2021-11-19 RX ORDER — QUETIAPINE FUMARATE 50 MG/1
50 TABLET, EXTENDED RELEASE ORAL NIGHTLY
Status: DISCONTINUED | OUTPATIENT
Start: 2021-11-19 | End: 2021-11-22 | Stop reason: HOSPADM

## 2021-11-19 RX ORDER — ALBUTEROL SULFATE 90 UG/1
2 AEROSOL, METERED RESPIRATORY (INHALATION) EVERY 6 HOURS PRN
Status: DISCONTINUED | OUTPATIENT
Start: 2021-11-19 | End: 2021-11-22 | Stop reason: HOSPADM

## 2021-11-19 RX ORDER — OXYCODONE HYDROCHLORIDE AND ACETAMINOPHEN 5; 325 MG/1; MG/1
1 TABLET ORAL EVERY 4 HOURS PRN
Status: DISCONTINUED | OUTPATIENT
Start: 2021-11-19 | End: 2021-11-22 | Stop reason: HOSPADM

## 2021-11-19 RX ORDER — SODIUM CHLORIDE 0.9 % (FLUSH) 0.9 %
10 SYRINGE (ML) INJECTION PRN
Status: DISCONTINUED | OUTPATIENT
Start: 2021-11-19 | End: 2021-11-19 | Stop reason: HOSPADM

## 2021-11-19 RX ORDER — SODIUM CHLORIDE 9 MG/ML
25 INJECTION, SOLUTION INTRAVENOUS PRN
Status: DISCONTINUED | OUTPATIENT
Start: 2021-11-19 | End: 2021-11-22 | Stop reason: HOSPADM

## 2021-11-19 RX ORDER — SODIUM CHLORIDE 0.9 % (FLUSH) 0.9 %
10 SYRINGE (ML) INJECTION EVERY 12 HOURS SCHEDULED
Status: DISCONTINUED | OUTPATIENT
Start: 2021-11-19 | End: 2021-11-19 | Stop reason: HOSPADM

## 2021-11-19 RX ORDER — LISINOPRIL 5 MG/1
5 TABLET ORAL DAILY
Status: DISCONTINUED | OUTPATIENT
Start: 2021-11-20 | End: 2021-11-22 | Stop reason: HOSPADM

## 2021-11-19 RX ORDER — INSULIN GLARGINE 100 [IU]/ML
24 INJECTION, SOLUTION SUBCUTANEOUS EVERY MORNING
Status: DISCONTINUED | OUTPATIENT
Start: 2021-11-20 | End: 2021-11-22 | Stop reason: HOSPADM

## 2021-11-19 RX ORDER — ALBUTEROL SULFATE 2.5 MG/3ML
2.5 SOLUTION RESPIRATORY (INHALATION) EVERY 6 HOURS PRN
Status: DISCONTINUED | OUTPATIENT
Start: 2021-11-19 | End: 2021-11-22 | Stop reason: HOSPADM

## 2021-11-19 RX ORDER — ONDANSETRON 2 MG/ML
4 INJECTION INTRAMUSCULAR; INTRAVENOUS
Status: DISCONTINUED | OUTPATIENT
Start: 2021-11-19 | End: 2021-11-19 | Stop reason: HOSPADM

## 2021-11-19 RX ORDER — MIDAZOLAM HYDROCHLORIDE 1 MG/ML
INJECTION INTRAMUSCULAR; INTRAVENOUS PRN
Status: DISCONTINUED | OUTPATIENT
Start: 2021-11-19 | End: 2021-11-19 | Stop reason: SDUPTHER

## 2021-11-19 RX ORDER — ROCURONIUM BROMIDE 10 MG/ML
INJECTION, SOLUTION INTRAVENOUS PRN
Status: DISCONTINUED | OUTPATIENT
Start: 2021-11-19 | End: 2021-11-19 | Stop reason: SDUPTHER

## 2021-11-19 RX ORDER — ACETAMINOPHEN 325 MG/1
650 TABLET ORAL EVERY 4 HOURS PRN
Status: DISCONTINUED | OUTPATIENT
Start: 2021-11-19 | End: 2021-11-22 | Stop reason: HOSPADM

## 2021-11-19 RX ORDER — NICOTINE POLACRILEX 4 MG
15 LOZENGE BUCCAL PRN
Status: DISCONTINUED | OUTPATIENT
Start: 2021-11-19 | End: 2021-11-22 | Stop reason: HOSPADM

## 2021-11-19 RX ORDER — ONDANSETRON 2 MG/ML
INJECTION INTRAMUSCULAR; INTRAVENOUS PRN
Status: DISCONTINUED | OUTPATIENT
Start: 2021-11-19 | End: 2021-11-19 | Stop reason: SDUPTHER

## 2021-11-19 RX ORDER — SENNA AND DOCUSATE SODIUM 50; 8.6 MG/1; MG/1
2 TABLET, FILM COATED ORAL 2 TIMES DAILY
Status: DISCONTINUED | OUTPATIENT
Start: 2021-11-19 | End: 2021-11-22 | Stop reason: HOSPADM

## 2021-11-19 RX ORDER — ONDANSETRON 2 MG/ML
4 INJECTION INTRAMUSCULAR; INTRAVENOUS EVERY 6 HOURS PRN
Status: DISCONTINUED | OUTPATIENT
Start: 2021-11-19 | End: 2021-11-22 | Stop reason: HOSPADM

## 2021-11-19 RX ORDER — MULTIVITAMIN/IRON/FOLIC ACID 18MG-0.4MG
250 TABLET ORAL DAILY
Status: DISCONTINUED | OUTPATIENT
Start: 2021-11-19 | End: 2021-11-22 | Stop reason: HOSPADM

## 2021-11-19 RX ORDER — PROPOFOL 10 MG/ML
INJECTION, EMULSION INTRAVENOUS PRN
Status: DISCONTINUED | OUTPATIENT
Start: 2021-11-19 | End: 2021-11-19 | Stop reason: SDUPTHER

## 2021-11-19 RX ORDER — LABETALOL 20 MG/4 ML (5 MG/ML) INTRAVENOUS SYRINGE
5 EVERY 10 MIN PRN
Status: DISCONTINUED | OUTPATIENT
Start: 2021-11-19 | End: 2021-11-19 | Stop reason: HOSPADM

## 2021-11-19 RX ORDER — CETIRIZINE HYDROCHLORIDE 10 MG/1
10 TABLET ORAL DAILY
Status: DISCONTINUED | OUTPATIENT
Start: 2021-11-19 | End: 2021-11-22 | Stop reason: HOSPADM

## 2021-11-19 RX ORDER — SODIUM PHOSPHATE, DIBASIC AND SODIUM PHOSPHATE, MONOBASIC 7; 19 G/133ML; G/133ML
1 ENEMA RECTAL DAILY PRN
Status: DISCONTINUED | OUTPATIENT
Start: 2021-11-19 | End: 2021-11-22 | Stop reason: HOSPADM

## 2021-11-19 RX ORDER — ATORVASTATIN CALCIUM 80 MG/1
80 TABLET, FILM COATED ORAL NIGHTLY
Status: DISCONTINUED | OUTPATIENT
Start: 2021-11-19 | End: 2021-11-22 | Stop reason: HOSPADM

## 2021-11-19 RX ORDER — SODIUM CHLORIDE 0.9 % (FLUSH) 0.9 %
10 SYRINGE (ML) INJECTION EVERY 12 HOURS SCHEDULED
Status: DISCONTINUED | OUTPATIENT
Start: 2021-11-19 | End: 2021-11-22 | Stop reason: HOSPADM

## 2021-11-19 RX ORDER — OXYCODONE HYDROCHLORIDE AND ACETAMINOPHEN 5; 325 MG/1; MG/1
TABLET ORAL
Status: COMPLETED
Start: 2021-11-19 | End: 2021-11-19

## 2021-11-19 RX ORDER — INSULIN GLARGINE 100 [IU]/ML
24 INJECTION, SOLUTION SUBCUTANEOUS DAILY
Status: DISCONTINUED | OUTPATIENT
Start: 2021-11-19 | End: 2021-11-19

## 2021-11-19 RX ORDER — SULFAMETHOXAZOLE AND TRIMETHOPRIM 800; 160 MG/1; MG/1
1 TABLET ORAL 2 TIMES DAILY
COMMUNITY

## 2021-11-19 RX ORDER — SODIUM CHLORIDE 9 MG/ML
INJECTION, SOLUTION INTRAVENOUS CONTINUOUS
Status: DISCONTINUED | OUTPATIENT
Start: 2021-11-19 | End: 2021-11-20

## 2021-11-19 RX ORDER — CYCLOBENZAPRINE HCL 10 MG
10 TABLET ORAL 3 TIMES DAILY PRN
Status: DISCONTINUED | OUTPATIENT
Start: 2021-11-19 | End: 2021-11-22 | Stop reason: HOSPADM

## 2021-11-19 RX ORDER — POTASSIUM CHLORIDE 750 MG/1
20 TABLET, FILM COATED, EXTENDED RELEASE ORAL NIGHTLY
Status: DISCONTINUED | OUTPATIENT
Start: 2021-11-19 | End: 2021-11-22 | Stop reason: HOSPADM

## 2021-11-19 RX ORDER — OXYCODONE HYDROCHLORIDE AND ACETAMINOPHEN 5; 325 MG/1; MG/1
2 TABLET ORAL EVERY 4 HOURS PRN
Status: DISCONTINUED | OUTPATIENT
Start: 2021-11-19 | End: 2021-11-22 | Stop reason: HOSPADM

## 2021-11-19 RX ORDER — LIDOCAINE HYDROCHLORIDE AND EPINEPHRINE 10; 10 MG/ML; UG/ML
INJECTION, SOLUTION INFILTRATION; PERINEURAL PRN
Status: DISCONTINUED | OUTPATIENT
Start: 2021-11-19 | End: 2021-11-19 | Stop reason: ALTCHOICE

## 2021-11-19 RX ORDER — SODIUM CHLORIDE 0.9 % (FLUSH) 0.9 %
10 SYRINGE (ML) INJECTION PRN
Status: DISCONTINUED | OUTPATIENT
Start: 2021-11-19 | End: 2021-11-22 | Stop reason: HOSPADM

## 2021-11-19 RX ADMIN — CYCLOBENZAPRINE 10 MG: 10 TABLET, FILM COATED ORAL at 21:42

## 2021-11-19 RX ADMIN — SUGAMMADEX 200 MG: 100 INJECTION, SOLUTION INTRAVENOUS at 12:35

## 2021-11-19 RX ADMIN — SODIUM CHLORIDE: 9 INJECTION, SOLUTION INTRAVENOUS at 23:32

## 2021-11-19 RX ADMIN — OXYCODONE HYDROCHLORIDE AND ACETAMINOPHEN 2 TABLET: 5; 325 TABLET ORAL at 19:05

## 2021-11-19 RX ADMIN — HYDROMORPHONE HYDROCHLORIDE 0.25 MG: 1 INJECTION, SOLUTION INTRAMUSCULAR; INTRAVENOUS; SUBCUTANEOUS at 13:25

## 2021-11-19 RX ADMIN — ONDANSETRON HYDROCHLORIDE 4 MG: 4 INJECTION, SOLUTION INTRAMUSCULAR; INTRAVENOUS at 10:38

## 2021-11-19 RX ADMIN — ATORVASTATIN CALCIUM 80 MG: 80 TABLET, FILM COATED ORAL at 21:39

## 2021-11-19 RX ADMIN — CETIRIZINE HYDROCHLORIDE 10 MG: 10 TABLET, FILM COATED ORAL at 18:20

## 2021-11-19 RX ADMIN — ROCURONIUM BROMIDE 40 MG: 10 INJECTION INTRAVENOUS at 10:25

## 2021-11-19 RX ADMIN — FENTANYL CITRATE 50 MCG: 50 INJECTION, SOLUTION INTRAMUSCULAR; INTRAVENOUS at 10:40

## 2021-11-19 RX ADMIN — INSULIN LISPRO 3 UNITS: 100 INJECTION, SOLUTION INTRAVENOUS; SUBCUTANEOUS at 23:39

## 2021-11-19 RX ADMIN — PHENYLEPHRINE HYDROCHLORIDE 100 MCG: 10 INJECTION INTRAVENOUS at 12:04

## 2021-11-19 RX ADMIN — PROPOFOL 160 MG: 10 INJECTION, EMULSION INTRAVENOUS at 10:25

## 2021-11-19 RX ADMIN — CEFAZOLIN 2000 MG: 10 INJECTION, POWDER, FOR SOLUTION INTRAVENOUS at 10:41

## 2021-11-19 RX ADMIN — HYDROMORPHONE HYDROCHLORIDE 0.25 MG: 1 INJECTION, SOLUTION INTRAMUSCULAR; INTRAVENOUS; SUBCUTANEOUS at 13:30

## 2021-11-19 RX ADMIN — FENTANYL CITRATE 50 MCG: 50 INJECTION, SOLUTION INTRAMUSCULAR; INTRAVENOUS at 10:26

## 2021-11-19 RX ADMIN — INSULIN HUMAN 6 UNITS: 100 INJECTION, SOLUTION PARENTERAL at 13:52

## 2021-11-19 RX ADMIN — CEFAZOLIN SODIUM 2000 MG: 2 INJECTION, SOLUTION INTRAVENOUS at 18:23

## 2021-11-19 RX ADMIN — Medication 10 MG: at 11:03

## 2021-11-19 RX ADMIN — PHENYLEPHRINE HYDROCHLORIDE 100 MCG: 10 INJECTION INTRAVENOUS at 11:54

## 2021-11-19 RX ADMIN — HYDROMORPHONE HYDROCHLORIDE 1 MG: 2 INJECTION INTRAMUSCULAR; INTRAVENOUS; SUBCUTANEOUS at 10:51

## 2021-11-19 RX ADMIN — Medication 10 MG: at 11:17

## 2021-11-19 RX ADMIN — MIDAZOLAM 2 MG: 1 INJECTION INTRAMUSCULAR; INTRAVENOUS at 10:26

## 2021-11-19 RX ADMIN — POTASSIUM CHLORIDE 20 MEQ: 750 TABLET, EXTENDED RELEASE ORAL at 21:39

## 2021-11-19 RX ADMIN — HYDROMORPHONE HYDROCHLORIDE 0.25 MG: 1 INJECTION, SOLUTION INTRAMUSCULAR; INTRAVENOUS; SUBCUTANEOUS at 13:20

## 2021-11-19 RX ADMIN — DOCUSATE SODIUM 50 MG AND SENNOSIDES 8.6 MG 2 TABLET: 8.6; 5 TABLET, FILM COATED ORAL at 21:39

## 2021-11-19 RX ADMIN — SODIUM CHLORIDE 25 ML: 9 INJECTION, SOLUTION INTRAVENOUS at 07:55

## 2021-11-19 RX ADMIN — ANASTROZOLE 1 MG: 1 TABLET, COATED ORAL at 21:39

## 2021-11-19 RX ADMIN — HYDROMORPHONE HYDROCHLORIDE 0.25 MG: 1 INJECTION, SOLUTION INTRAMUSCULAR; INTRAVENOUS; SUBCUTANEOUS at 13:40

## 2021-11-19 RX ADMIN — OXYCODONE HYDROCHLORIDE AND ACETAMINOPHEN 2 TABLET: 5; 325 TABLET ORAL at 14:44

## 2021-11-19 RX ADMIN — FENOFIBRATE 160 MG: 160 TABLET ORAL at 18:20

## 2021-11-19 RX ADMIN — QUETIAPINE FUMARATE 50 MG: 50 TABLET, EXTENDED RELEASE ORAL at 21:40

## 2021-11-19 RX ADMIN — ROCURONIUM BROMIDE 10 MG: 10 INJECTION INTRAVENOUS at 10:38

## 2021-11-19 RX ADMIN — Medication 10 MG: at 11:10

## 2021-11-19 RX ADMIN — SODIUM CHLORIDE: 9 INJECTION, SOLUTION INTRAVENOUS at 13:38

## 2021-11-19 RX ADMIN — OXYCODONE HYDROCHLORIDE AND ACETAMINOPHEN 2 TABLET: 5; 325 TABLET ORAL at 23:36

## 2021-11-19 RX ADMIN — Medication 10 MG: at 11:39

## 2021-11-19 RX ADMIN — Medication 100 MG: at 10:25

## 2021-11-19 ASSESSMENT — PULMONARY FUNCTION TESTS
PIF_VALUE: 21
PIF_VALUE: 6
PIF_VALUE: 21
PIF_VALUE: 20
PIF_VALUE: 6
PIF_VALUE: 20
PIF_VALUE: 21
PIF_VALUE: 20
PIF_VALUE: 22
PIF_VALUE: 20
PIF_VALUE: 18
PIF_VALUE: 20
PIF_VALUE: 3
PIF_VALUE: 21
PIF_VALUE: 2
PIF_VALUE: 20
PIF_VALUE: 20
PIF_VALUE: 22
PIF_VALUE: 20
PIF_VALUE: 2
PIF_VALUE: 18
PIF_VALUE: 34
PIF_VALUE: 20
PIF_VALUE: 21
PIF_VALUE: 18
PIF_VALUE: 22
PIF_VALUE: 20
PIF_VALUE: 21
PIF_VALUE: 20
PIF_VALUE: 21
PIF_VALUE: 20
PIF_VALUE: 7
PIF_VALUE: 19
PIF_VALUE: 1
PIF_VALUE: 20
PIF_VALUE: 0
PIF_VALUE: 22
PIF_VALUE: 20
PIF_VALUE: 23
PIF_VALUE: 20
PIF_VALUE: 21
PIF_VALUE: 21
PIF_VALUE: 20
PIF_VALUE: 21
PIF_VALUE: 21
PIF_VALUE: 22
PIF_VALUE: 20
PIF_VALUE: 21
PIF_VALUE: 23
PIF_VALUE: 21
PIF_VALUE: 25
PIF_VALUE: 22
PIF_VALUE: 21
PIF_VALUE: 20
PIF_VALUE: 21
PIF_VALUE: 20
PIF_VALUE: 20
PIF_VALUE: 16
PIF_VALUE: 22
PIF_VALUE: 0
PIF_VALUE: 21
PIF_VALUE: 20
PIF_VALUE: 21
PIF_VALUE: 1
PIF_VALUE: 22
PIF_VALUE: 20
PIF_VALUE: 22
PIF_VALUE: 20
PIF_VALUE: 20
PIF_VALUE: 21
PIF_VALUE: 20
PIF_VALUE: 22
PIF_VALUE: 20
PIF_VALUE: 22
PIF_VALUE: 20
PIF_VALUE: 20
PIF_VALUE: 21
PIF_VALUE: 20
PIF_VALUE: 22
PIF_VALUE: 20
PIF_VALUE: 0
PIF_VALUE: 20
PIF_VALUE: 21
PIF_VALUE: 20
PIF_VALUE: 21
PIF_VALUE: 22
PIF_VALUE: 20
PIF_VALUE: 21
PIF_VALUE: 20
PIF_VALUE: 22
PIF_VALUE: 20
PIF_VALUE: 2
PIF_VALUE: 22
PIF_VALUE: 20
PIF_VALUE: 18
PIF_VALUE: 22
PIF_VALUE: 18
PIF_VALUE: 20
PIF_VALUE: 20
PIF_VALUE: 21
PIF_VALUE: 1
PIF_VALUE: 21
PIF_VALUE: 6
PIF_VALUE: 21
PIF_VALUE: 22
PIF_VALUE: 22
PIF_VALUE: 20
PIF_VALUE: 21
PIF_VALUE: 21
PIF_VALUE: 23
PIF_VALUE: 20
PIF_VALUE: 23
PIF_VALUE: 20
PIF_VALUE: 26
PIF_VALUE: 17
PIF_VALUE: 20
PIF_VALUE: 18
PIF_VALUE: 21
PIF_VALUE: 21
PIF_VALUE: 22
PIF_VALUE: 20
PIF_VALUE: 22
PIF_VALUE: 20
PIF_VALUE: 21
PIF_VALUE: 21
PIF_VALUE: 20
PIF_VALUE: 21
PIF_VALUE: 20
PIF_VALUE: 22
PIF_VALUE: 21
PIF_VALUE: 20
PIF_VALUE: 23
PIF_VALUE: 20
PIF_VALUE: 21
PIF_VALUE: 20
PIF_VALUE: 23
PIF_VALUE: 20
PIF_VALUE: 2
PIF_VALUE: 20
PIF_VALUE: 21
PIF_VALUE: 24
PIF_VALUE: 21
PIF_VALUE: 20
PIF_VALUE: 20
PIF_VALUE: 21
PIF_VALUE: 20
PIF_VALUE: 21
PIF_VALUE: 20
PIF_VALUE: 21
PIF_VALUE: 20
PIF_VALUE: 19
PIF_VALUE: 20
PIF_VALUE: 20

## 2021-11-19 ASSESSMENT — PAIN SCALES - GENERAL
PAINLEVEL_OUTOF10: 6
PAINLEVEL_OUTOF10: 8
PAINLEVEL_OUTOF10: 10
PAINLEVEL_OUTOF10: 5
PAINLEVEL_OUTOF10: 6
PAINLEVEL_OUTOF10: 8
PAINLEVEL_OUTOF10: 9
PAINLEVEL_OUTOF10: 6

## 2021-11-19 ASSESSMENT — LIFESTYLE VARIABLES: SMOKING_STATUS: 1

## 2021-11-19 NOTE — FLOWSHEET NOTE
Pt returned to [facility] room 15 awaiting room assignment on 7K. Vitals and assessment as charted. 0.9 infusing, @400ml to count from PACU. Pt has muffin and water. Family at the bedside. Pt and family verbalized understanding call light use. Call light in reach.

## 2021-11-19 NOTE — FLOWSHEET NOTE
Pt admitted to [facility] room 15 and oriented to unit. Fall, allergy, limb alert and blood bands applied. SCD sleeves applied. Nares swabbed. Pt verbalized permission for first name, last initial and physicians name on white board. SDS board and discharge criteria explained, pt and family verbalized understanding. Pt denies thoughts of harming self or others. Call light in reach. Family at the bedside.
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
## 2021-11-19 NOTE — H&P
I have reviewed the history and physical and examined the patient. I find no relevant changes. I have reviewed with the patient and/or family members, during the preoperative office visit the risks, benefits, and alternatives to the procedure.     Antonette Julian MD

## 2021-11-19 NOTE — ANESTHESIA POSTPROCEDURE EVALUATION
Department of Anesthesiology  Postprocedure Note    Patient: Jad Meyers  MRN: [id number]  YOB: 1970  Date of evaluation: 11/19/2021  Time:  4:07 PM     Procedure Summary     Date: 11/19/21 Room / Location: [facility]  / [facility]    Anesthesia Start: 2903 Anesthesia Stop: 3561    Procedure: REMOVAL OF HARDWARE L5-S1, LAMINECTOMY L4-L5, PSF, L4-S1 (N/A Spine Lumbar) Diagnosis: (LUMBAR RADICULOPATHY)    Surgeons: Dunia Guevara MD Responsible Provider: Andrei Dumont DO    Anesthesia Type: General ASA Status: 3          Anesthesia Type: General    Aliya Phase I: Aliya Score: 9    Aliya Phase II: Aliya Score: 9    Last vitals: Reviewed and per EMR flowsheets.        Anesthesia Post Evaluation    Patient location during evaluation: PACU  Patient participation: complete - patient participated  Level of consciousness: awake and alert  Pain score: 2  Airway patency: patent  Nausea & Vomiting: no nausea and no vomiting  Complications: no  Cardiovascular status: hemodynamically stable and blood pressure returned to baseline  Respiratory status: spontaneous ventilation, room air and acceptable  Hydration status: stable

## 2021-11-19 NOTE — BRIEF OP NOTE
Brief Postoperative Note      Patient: Mayr De Paz  YOB: 1970  MRN: [id number]    Date of Procedure: 11/19/2021    Pre-Op Diagnosis: LUMBAR RADICULOPATHY    Post-Op Diagnosis: Same       Procedure(s):  REMOVAL OF HARDWARE L5-S1, LAMINECTOMY L4-L5, PSF, L4-S1    Surgeon(s):  Will Man MD    Assistant:  Diane Lofton [facility]    Anesthesia: General    Estimated Blood Loss (mL): 666    Complications: None    Specimens:   * No specimens in log *    Implants:  Implant Name Type Inv.  Item Serial No.  Lot No. LRB No. Used Action   BONE GRAFT FIBREX DEMIN BONE FIBERS  BONE GRAFT FIBREX DEMIN BONE FIBERS  [employer] [id number] N/A 1 Implanted   CAGE SPNL 3D 12X22 MM LORDTC MTL STRL FORTILINK-L  CAGE SPNL 3D 12X22 MM LORDTC MTL STRL FORTILINK-L  [employer] [id number] N/A 1 Implanted   GRAFT BNE SUB [id number] 24ML DEMIN BNE MTRX BIOADAPT BRDG  GRAFT BNE SUB [id number] 24ML DEMIN BNE MTRX BIOADAPT BRDG  [employer] [id number] N/A 1 Implanted         Drains:   Urethral Catheter (Active)       Findings: same    Electronically signed by Will Man MD on 11/19/2021 at 12:29 PM
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
## 2021-11-19 NOTE — H&P
bronchitis, endometriosis, type 2 diabetes, anxiety, depression. CURRENT MEDICATIONS:  Include omeprazole, fenofibrate, atorvastatin,  lisinopril, loratadine, venlafaxine, anastrozole, Bactrim DS, potassium  chloride, albuterol, baclofen, and Flexeril. PAST SURGICAL HISTORY:  Significant for  , , . Hysterectomy in . Right rotator cuff surgery in ,  cholecystectomy in , appendectomy in [place] [address], and an L5-S1 TLIF in  2015. SOCIAL HISTORY:  She is a current smoker, smokes approximately one pack  of cigarettes per day. She currently lives at a private home. REVIEW OF SYSTEMS:  GENERAL:  Denies fever, fatigue, or chills. RESPIRATORY:  Denies shortness of breath or productive cough. CARDIOVASCULAR:  Denies chest pain, palpitations, or abnormal heart  beat. GASTROINTESTINAL:  Denies nausea, vomiting, or diarrhea. GENITOURINARY:  Denies dysuria or bladder incontinence. MUSCULOSKELETAL:  Significant for low back pain and right leg pain. Denies neck pain, muscle pain, joint pain, or joint swelling. NEUROLOGIC:  Denies faintings, blackouts, or headaches. PHYSICAL EXAMINATION:  VITAL SIGNS:  Most recent dated 10/19/2021 demonstrates a height of 5  feet 4-1/2 inches, weight is 175 pounds, BMI 29.57. GENERAL:  The patient is calm, cooperative, alert, and oriented x3  sitting in the exam room chair in no acute distress. She does ambulate  with a walker with four wheels today for ambulation. MUSCULOSKELETAL:  Examination of the lumbar spine demonstrates a  well-healed midline scar with no open wounds or lesions. There is no  tenderness to palpation in the midline or bilateral paraspinal muscles. Examination of the bilateral lower extremities demonstrates skin is  warm, dry, and intact with no open wounds or lesions. Distal pulses are  +2 at the ankles bilaterally. Calves are soft and nontender without  evidence of DVT.   She does demonstrate weakness, graded 3+/5 with pedal  push and pull. Bilateral knee extension graded 4/5. Otherwise,  strength is maintained at 5/5. Negative straight leg raise bilaterally. IMAGING:  MRI, lumbar spine completed 09/14/2021. IMPRESSION:  At L4-L5 level, there is a mild displaced narrowing with  endplate spondylosis. There is moderate to severe recess and foraminal  stenosis bilateral due to facet arthropathy and disk bulge. Previous  instrumentation, fusion, and decompression at L5-S1. LABORATORY DATA:  Dated 10/21/2021 demonstrated hemoglobin 15.1,  hematocrit 45.0. MRSA screen is negative. ASSESSMENT:  Lumbar spinal stenosis with radiculopathy. PLAN:  The plan moving forward for the patient is for her to undergo  surgery at [address] [place] [address] on the date of 11/19/2021  undergoing a removal of hardware L5-S1, L4-L5 laminectomy, L4-L5  posterolateral interbody fusion, and L4 to S1 posterior spinal fusion. CONCLUSION:  The patient is a 66-year-old female who previously  underwent a lumbar spine surgery by Dr. Jeremy Moreno in 2015 undergoing an  L5-S1 laminectomy and fusion with a TLIF. She did report minimal  improvement after her first surgical intervention, but significantly  worsened over the past several months describing a predominantly right  lower extremity radiculopathy with associated weakness causing  difficulties with ambulation and which naturally requires a walker with  wheels for ambulatory assistance. She does report multiple falls due to  weakness in her lower extremities. She failed to improve despite  conservative treatment and management including Percocet, Flexeril,  baclofen, heat, ice, physical therapy, and epidural injections and was  worked up with an MRI which demonstrated adjacent level spinal stenosis  at L4-L5 to her previous L5-S1 fusion.   Due to her failed conservative  treatment and management, she has elected to undergo surgical  intervention and has been surgically cleared by her PCP, Coby Jonas CNP, to undergo surgery. We have received informed consent from the  patient. We have gone over the risks and benefits of surgery which  include postoperative pain, blood loss, blood loss requiring  transfusion, wound infection, nerve root injury, nerve root irritation,  CSF leak, and difficulty with anesthesia. We have answered the  questions and concerns to the patient's satisfaction. Absolutely, no  guarantees have been made as far as the results of the surgery.         Macey Rodriguez    D: 11/18/2021 17:42:22       T: 11/18/2021 17:47:21     [email or web address]  Job#: [id number]     Doc#: [id number]

## 2021-11-19 NOTE — PROGRESS NOTES
Patient brought from [facility]. Patient alert and oriented x 4. IV of normal saline infusing at 100ml/hr with 900ml left in bag. 02 via nasal cannula 1L. Surgical dressing to back dry and intact, hemovac x 2. Edgar intact with clear, yellow urine. SCD bilateral lower legs. Patient oriented to room and call light. Family at bedside. Ice water given. See vitals and physical assessment for further details.

## 2021-11-20 PROBLEM — M48.00 SPINAL STENOSIS: Status: ACTIVE | Noted: 2021-11-20

## 2021-11-20 LAB
ANION GAP SERPL CALCULATED.3IONS-SCNC: 11 MEQ/L (ref 8–16)
BASOPHILS # BLD: 0.4 %
BASOPHILS ABSOLUTE: 0 THOU/MM3 (ref 0–0.1)
BUN BLDV-MCNC: 11 MG/DL (ref 7–22)
CALCIUM SERPL-MCNC: 6.7 MG/DL (ref 8.5–10.5)
CHLORIDE BLD-SCNC: 112 MEQ/L (ref 98–111)
CO2: 18 MEQ/L (ref 23–33)
CREAT SERPL-MCNC: 0.6 MG/DL (ref 0.4–1.2)
EOSINOPHIL # BLD: 0.6 %
EOSINOPHILS ABSOLUTE: 0.1 THOU/MM3 (ref 0–0.4)
ERYTHROCYTE [DISTWIDTH] IN BLOOD BY AUTOMATED COUNT: 16.3 % (ref 11.5–14.5)
ERYTHROCYTE [DISTWIDTH] IN BLOOD BY AUTOMATED COUNT: 51.3 FL (ref 35–45)
GFR SERPL CREATININE-BSD FRML MDRD: > 90 ML/MIN/1.73M2
GLUCOSE BLD-MCNC: 110 MG/DL (ref 70–108)
GLUCOSE BLD-MCNC: 119 MG/DL (ref 70–108)
GLUCOSE BLD-MCNC: 119 MG/DL (ref 70–108)
GLUCOSE BLD-MCNC: 120 MG/DL (ref 70–108)
GLUCOSE BLD-MCNC: 218 MG/DL (ref 70–108)
HCT VFR BLD CALC: 30 % (ref 37–47)
HEMOGLOBIN: 9.7 GM/DL (ref 12–16)
IMMATURE GRANS (ABS): 0.05 THOU/MM3 (ref 0–0.07)
IMMATURE GRANULOCYTES: 0.5 %
LYMPHOCYTES # BLD: 20.3 %
LYMPHOCYTES ABSOLUTE: 2.2 THOU/MM3 (ref 1–4.8)
MCH RBC QN AUTO: 27.8 PG (ref 26–33)
MCHC RBC AUTO-ENTMCNC: 32.3 GM/DL (ref 32.2–35.5)
MCV RBC AUTO: 86 FL (ref 81–99)
MONOCYTES # BLD: 11.2 %
MONOCYTES ABSOLUTE: 1.2 THOU/MM3 (ref 0.4–1.3)
NUCLEATED RED BLOOD CELLS: 0 /100 WBC
PLATELET # BLD: 138 THOU/MM3 (ref 130–400)
PMV BLD AUTO: 13 FL (ref 9.4–12.4)
POTASSIUM SERPL-SCNC: 3.9 MEQ/L (ref 3.5–5.2)
RBC # BLD: 3.49 MILL/MM3 (ref 4.2–5.4)
SEG NEUTROPHILS: 67 %
SEGMENTED NEUTROPHILS ABSOLUTE COUNT: 7.3 THOU/MM3 (ref 1.8–7.7)
SODIUM BLD-SCNC: 141 MEQ/L (ref 135–145)
WBC # BLD: 10.9 THOU/MM3 (ref 4.8–10.8)

## 2021-11-20 PROCEDURE — 1200000000 HC SEMI PRIVATE

## 2021-11-20 PROCEDURE — 6370000000 HC RX 637 (ALT 250 FOR IP): Performed by: PHYSICIAN ASSISTANT

## 2021-11-20 PROCEDURE — 97116 GAIT TRAINING THERAPY: CPT

## 2021-11-20 PROCEDURE — 2580000003 HC RX 258: Performed by: PHYSICIAN ASSISTANT

## 2021-11-20 PROCEDURE — 85025 COMPLETE CBC W/AUTO DIFF WBC: CPT

## 2021-11-20 PROCEDURE — 97166 OT EVAL MOD COMPLEX 45 MIN: CPT

## 2021-11-20 PROCEDURE — 82948 REAGENT STRIP/BLOOD GLUCOSE: CPT

## 2021-11-20 PROCEDURE — 6360000002 HC RX W HCPCS: Performed by: PHYSICIAN ASSISTANT

## 2021-11-20 PROCEDURE — 80048 BASIC METABOLIC PNL TOTAL CA: CPT

## 2021-11-20 PROCEDURE — 36415 COLL VENOUS BLD VENIPUNCTURE: CPT

## 2021-11-20 PROCEDURE — 99232 SBSQ HOSP IP/OBS MODERATE 35: CPT | Performed by: HOSPITALIST

## 2021-11-20 PROCEDURE — 6370000000 HC RX 637 (ALT 250 FOR IP): Performed by: STUDENT IN AN ORGANIZED HEALTH CARE EDUCATION/TRAINING PROGRAM

## 2021-11-20 PROCEDURE — 97163 PT EVAL HIGH COMPLEX 45 MIN: CPT

## 2021-11-20 PROCEDURE — 97530 THERAPEUTIC ACTIVITIES: CPT

## 2021-11-20 RX ADMIN — DOCUSATE SODIUM 50 MG AND SENNOSIDES 8.6 MG 2 TABLET: 8.6; 5 TABLET, FILM COATED ORAL at 21:23

## 2021-11-20 RX ADMIN — CYCLOBENZAPRINE 10 MG: 10 TABLET, FILM COATED ORAL at 22:25

## 2021-11-20 RX ADMIN — FENOFIBRATE 160 MG: 160 TABLET ORAL at 21:28

## 2021-11-20 RX ADMIN — Medication 250 MG: at 07:58

## 2021-11-20 RX ADMIN — OXYCODONE HYDROCHLORIDE AND ACETAMINOPHEN 2 TABLET: 5; 325 TABLET ORAL at 16:44

## 2021-11-20 RX ADMIN — SODIUM CHLORIDE: 9 INJECTION, SOLUTION INTRAVENOUS at 08:13

## 2021-11-20 RX ADMIN — OXYCODONE HYDROCHLORIDE AND ACETAMINOPHEN 2 TABLET: 5; 325 TABLET ORAL at 08:05

## 2021-11-20 RX ADMIN — DOCUSATE SODIUM 50 MG AND SENNOSIDES 8.6 MG 2 TABLET: 8.6; 5 TABLET, FILM COATED ORAL at 07:58

## 2021-11-20 RX ADMIN — CETIRIZINE HYDROCHLORIDE 10 MG: 10 TABLET, FILM COATED ORAL at 07:58

## 2021-11-20 RX ADMIN — INSULIN GLARGINE 24 UNITS: 100 INJECTION, SOLUTION SUBCUTANEOUS at 08:05

## 2021-11-20 RX ADMIN — CYCLOBENZAPRINE 10 MG: 10 TABLET, FILM COATED ORAL at 04:23

## 2021-11-20 RX ADMIN — QUETIAPINE FUMARATE 50 MG: 50 TABLET, EXTENDED RELEASE ORAL at 21:28

## 2021-11-20 RX ADMIN — OXYCODONE HYDROCHLORIDE AND ACETAMINOPHEN 2 TABLET: 5; 325 TABLET ORAL at 04:23

## 2021-11-20 RX ADMIN — POLYETHYLENE GLYCOL 3350 17 G: 17 POWDER, FOR SOLUTION ORAL at 07:59

## 2021-11-20 RX ADMIN — SODIUM CHLORIDE, PRESERVATIVE FREE 10 ML: 5 INJECTION INTRAVENOUS at 21:23

## 2021-11-20 RX ADMIN — OXYCODONE HYDROCHLORIDE AND ACETAMINOPHEN 2 TABLET: 5; 325 TABLET ORAL at 12:42

## 2021-11-20 RX ADMIN — ATORVASTATIN CALCIUM 80 MG: 80 TABLET, FILM COATED ORAL at 21:28

## 2021-11-20 RX ADMIN — POTASSIUM CHLORIDE 20 MEQ: 750 TABLET, EXTENDED RELEASE ORAL at 21:28

## 2021-11-20 RX ADMIN — OXYCODONE HYDROCHLORIDE AND ACETAMINOPHEN 2 TABLET: 5; 325 TABLET ORAL at 21:31

## 2021-11-20 RX ADMIN — CYCLOBENZAPRINE 10 MG: 10 TABLET, FILM COATED ORAL at 15:46

## 2021-11-20 RX ADMIN — CEFAZOLIN SODIUM 2000 MG: 2 INJECTION, SOLUTION INTRAVENOUS at 02:43

## 2021-11-20 RX ADMIN — NALOXEGOL OXALATE 12.5 MG: 12.5 TABLET, FILM COATED ORAL at 07:58

## 2021-11-20 RX ADMIN — PANTOPRAZOLE SODIUM 40 MG: 40 TABLET, DELAYED RELEASE ORAL at 06:36

## 2021-11-20 ASSESSMENT — PAIN SCALES - GENERAL
PAINLEVEL_OUTOF10: 8
PAINLEVEL_OUTOF10: 0
PAINLEVEL_OUTOF10: 9
PAINLEVEL_OUTOF10: 7
PAINLEVEL_OUTOF10: 9
PAINLEVEL_OUTOF10: 8
PAINLEVEL_OUTOF10: 8

## 2021-11-20 ASSESSMENT — PAIN DESCRIPTION - ORIENTATION: ORIENTATION: LOWER

## 2021-11-20 ASSESSMENT — PAIN DESCRIPTION - ONSET: ONSET: ON-GOING

## 2021-11-20 ASSESSMENT — PAIN DESCRIPTION - FREQUENCY: FREQUENCY: CONTINUOUS

## 2021-11-20 ASSESSMENT — PAIN DESCRIPTION - DESCRIPTORS: DESCRIPTORS: ACHING;SHARP

## 2021-11-20 ASSESSMENT — PAIN - FUNCTIONAL ASSESSMENT: PAIN_FUNCTIONAL_ASSESSMENT: PREVENTS OR INTERFERES SOME ACTIVE ACTIVITIES AND ADLS

## 2021-11-20 ASSESSMENT — PAIN DESCRIPTION - PAIN TYPE
TYPE: SURGICAL PAIN

## 2021-11-20 ASSESSMENT — PAIN DESCRIPTION - LOCATION
LOCATION: BACK

## 2021-11-20 ASSESSMENT — PAIN DESCRIPTION - PROGRESSION: CLINICAL_PROGRESSION: NOT CHANGED

## 2021-11-20 NOTE — OP NOTE
[address] [place] [address]                                OPERATIVE REPORT    PATIENT NAME: Bee Cagle                :        1970  MED REC NO:   [id number]                           ROOM:       0024  ACCOUNT NO:   [de-identified]                           ADMIT DATE: 2021  PROVIDER:     Sterling Silva. Bisi Saldana M.D.    Prem Rump:  2021    PREOPERATIVE DIAGNOSES:  1. Lumbar spinal stenosis of L4-L5 level. 2.  Status post previous L5-S1 decompression and fusion of      instrumentation, previous date of surgery of , performed by Dr. Rigoberto Rodriguez. [address] 3.  Lumbar radiculopathy and right lower extremity radiculopathy. POSTOPERATIVE DIAGNOSES:  1. Lumbar spinal stenosis of L4-L5 level. 2.  Status post previous L5-S1 decompression and fusion of      instrumentation, previous date of surgery of , performed by Dr. Rigoberto Rodriguez. [address] 3.  Lumbar radiculopathy and right lower extremity radiculopathy. PROCEDURES PERFORMED:  1. Removal of hardware at levels of L5-S1 including setscrew caps and      bridging vibha including CrossLink of L5-S1 level. 2.  An assessment and fusion at levels of L5-S1 with finding of solid      union. 3. L4-L5 posterior lumbar interbody fusion and bilateral fusion at the      L4-L5 level. 4.  Insertion of interbody cage fixation, a 12 x 22 mm cage by      Surgalign, a pink material, nonabsorbable, filled with local bone      grafting for fusion across the L4-L5 level. 5.  The use of one small kit, 2 x 5 cm Surgalign BioAdapt bridge and one      2 mL kit of the FiberX DBM bone graft by [facility] for fusion over      levels of L4, L5, and S1.  6.  S1 posterior spinal fusion over levels of L4 and L5.  7.   Instrumentation over levels of L4, L5, S1 with use of the Way[employer]      Legacy pedicle screw fixation system, placed bilateral and segmental.  8.  Use of local bone bilaterally, but decompress the recess across L4-L5  bilaterally and as well decompress the hypertrophic facet joint change  causing neurocompression of the L5-S1 foramen. We were able to  thoroughly clear the recess and foramina as well performing a complete  decompression bilaterally. I would proceed to expose the disk space  across L4-L5 and open the disk space across L4-L5 and decompress the  interbody surface across the L4-L5 in preparation for fusion with use of  curettage and shaving techniques. This interbody surface could be well  prepared for fusion with use of the Surgalign preparation instruments. Ultimately choosing a 12 x 22 mm size cage which was a Surgalign  Fortilink cage which would be filled with use of a FiberX bone graft and  tamped into place at this level. We tamped this into place very well. We seated it and took x-rays that were confirmed with the addition of  the cage. I then removed the insertion device. I would open the  pedicles at levels of L4 bilaterally, perform cannulation and threaded  tapping and also decorticate the posterior margin transverse process of  L4 bilaterally to be able to insert a 6.5 x 50 mm screw in the L4 level  bilateral and test threshold, each of which would measure better than 30  milliamps bilateral.  We then used the 60-mm vibha, one of these per side  and three setscrew caps set as well to connect the screws and the rods  bilaterally to complete the instrumentation over levels of L4, L5, and  S1. All thresholds have been checked, the setscrew caps are broken off  at the appropriate torque and also I would also compress the L4-L5 screw  heads for further engagement of this interbody cage. This would fit  very well. Irrigation was completed with use of Irrisept and saline  solution and there was no evidence of durotomy. I would fill the bone  laterally across the transverse process of L5 and S1 with use of a  Surgalign bone graft.   I extended this with use of FiberX and local bone  over levels of L4, L5, and S1. After thorough irrigation and suctioning dry, all bone graft is well  positioned, we took two x-rays and reviewed these, and then we would  close in layers and finish the dressing application. Drain tube was  sewn in as well. My first assistant was also Diane Lofton, physician  assistant certified as well. Diane Lofton, PAC, assisted throughout the procedure with positioning,  draping, retraction, wound closure, dressing, and splint application.         Shane Whitman M.D.    D: 11/19/2021 12:49:06       T: 11/19/2021 15:46:21     [email or web address]  Job#: [id number]     Doc#: [id number]    CC:

## 2021-11-20 NOTE — PROGRESS NOTES
[address] [facility] - [id number]    Time In: 0663  Time Out: 9605  Timed Code Treatment Minutes: 8 Minutes  Minutes: 20          Date: 2021  Patient Name: Emy Farrar,  Gender:  female        MRN: [id number]  : 1970  (46 y.o.)      Referring Practitioner: ESCOBAR Esquivel  Diagnosis: spinal stenosis of lumbosacral region  Additional Pertinent Hx: admit with above diagnosis, s/pREMOVAL OF HARDWARE L5-S1, LAMINECTOMY L4-L5, PSF, L4-S1 on 21     Restrictions/Precautions:  Restrictions/Precautions: General Precautions, Fall Risk  Required Braces or Orthoses  Spinal: Lumbar Corset  Position Activity Restriction  Spinal Precautions: No Bending, No Lifting, No Twisting    Subjective:  Chart Reviewed: Yes  Patient assessed for rehabilitation services?: Yes  Subjective: pleasant, cooperative, motivated, RN ok'd session    General:    Hearing: Within functional limits      Pain: 8/10: back, per RN recently issued pt pain meds    Vitals: Vitals not assessed per clinical judgement, see nursing flowsheet    Social/Functional History:    Lives With: Daughter, Other (comment) (Ex-, daughter, DIONNE, and 3 grandchildren)  Type of Home: House  Home Layout: One level, Able to Live on Main level with bedroom/bathroom  Home Access: Ramped entrance  Home Equipment: 4 wheeled walker     Bathroom Shower/Tub: Tub/Shower unit  Bathroom Toilet: Standard  Bathroom Equipment: Grab bars in shower, Grab bars around toilet       ADL Assistance: [address] Responsibilities: Yes (However family members will be completing after discharge)  Ambulation Assistance: Independent  Transfer Assistance: Independent    Active : Yes  Occupation: On disability  Leisure & Hobbies: Enjoys crafting       OBJECTIVE:  Range of Motion:  Bilateral Lower Extremity: WFL    Strength:  Bilateral Lower Extremity: >/=3/5, generalized weakness    Balance:  Static Sitting Balance:  Stand By Assistance, assist to tu lumbar corset  Static Standing Balance: Contact Guard Assistance, with rollator    Bed Mobility:  Rolling to Right: Stand By Assistance   Supine to Sit: Stand By Assistance  Scooting: Stand By Assistance  HOB Up 20 degrees, use BR, cues for log roll technique  Transfers:  Sit to Stand: Contact Guard Assistance  Stand to [address] [employer] [address] time to complete with multiple attempts due to pain  Ambulation:  Contact Guard Assistance  Distance: 3'x1, 20'x1  Surface: Level Tile  Device:Rolling Walker  Gait Deviations: Forward Flexed Posture, Slow Tyesha, Mild Path Deviations and no LOB    Reviewed back precautions/body mechanics with handout issued, pt verbalized understanding    Functional Outcome Measures: Completed  AM-PAC Inpatient Mobility Raw Score : 18  AM-PAC Inpatient T-Scale Score : 43.63    ASSESSMENT:  Activity Tolerance:  Patient tolerance of  treatment: good. Treatment Initiated: Treatment and education initiated within context of evaluation. Evaluation time included review of current medical information, gathering information related to past medical, social and functional history, completion of standardized testing, formal and informal observation of tasks, assessment of data and development of plan of care and goals. Treatment time included skilled education and facilitation of tasks to increase safety and independence with functional mobility for improved independence and quality of life. Assessment:   Body structures, Functions, Activity limitations: Decreased functional mobility , Decreased endurance, Decreased strength, Decreased balance, Increased pain  Assessment: pt tolerated fair, s/p back surgery with lumbar drains, back pain, lumbar corset, back precautions, generalized weakness, dec balance with use of walker and inc assist for safe mobility, recommend cont PT to inc pt I with functional mobility  Prognosis: Good    REQUIRES PT FOLLOW UP: Yes    Discharge Recommendations:  Discharge Recommendations: Continue to assess pending progress (anticipate home with family assist as needed)    Patient Education:  PT Education: Goals, PT Role, Plan of Care, Functional Mobility Training, Precautions    Equipment Recommendations:  Equipment Needed: No    Plan:  Times per week: 6X O  Times per day: Daily  Specific instructions for Next Treatment: therex and mobility with back precautions    Goals:  Patient goals : go home tomorrow  Short term goals  Time Frame for Short term goals: by discharge  Short term goal 1: bed mobility with S to get in/out of bed  Short term goal 2: transfer with S to get in/out of chairs  Short term goal 3: amb >100'x1 with walker and S to walk safely in home  Long term goals  Time Frame for Long term goals : no LTGs set secondary to short ELOS    Following session, patient left in safe position with all fall risk precautions in place.

## 2021-11-20 NOTE — PROGRESS NOTES
[address] 60  INPATIENT OCCUPATIONAL THERAPY  [facility] ORTHOPEDICS 7K  EVALUATION    Time:   Time In: 9655  Time Out: 1022  Timed Code Treatment Minutes: 19 Minutes  Minutes: 29    Date: 2021  Patient Name: Chel Todd,   Gender: female      MRN: [id number]  : 1970  (46 y.o.)  Referring Practitioner: Celia Pickard PA-C  Diagnosis: Spinal stenosis of lumbosacral region  Additional Pertinent Hx: Pt is s/p REMOVAL OF HARDWARE L5-S1, LAMINECTOMY L4-L5, PSF, L4-S1 on 21 by Dr. Paolo Prado. Restrictions/Precautions:  Restrictions/Precautions: General Precautions, Fall Risk  Required Braces or Orthoses  Spinal: Lumbar Corset  Position Activity Restriction  Spinal Precautions: No Bending, No Lifting, No Twisting    Subjective  Chart Reviewed: Yes, Orders, History and Physical, Operative Notes  Patient assessed for rehabilitation services?: Yes  Family / Caregiver Present: No    Subjective: RN approved OT session. Pt sitting up in recliner upon arrival, agreeable to participating in OT session. Pt reporting is hopeful to discharge home tomorrow. Pain:  Pain Assessment  Patient Currently in Pain: Yes  Pain Level: 9    Vitals: Vitals not assessed per clinical judgement.     Social/Functional History:  Lives With: Daughter, Other (comment) (Ex-, daughter, DIONNE, and 3 grandchildren)  Type of Home: House  Home Layout: One level, Able to Live on Main level with bedroom/bathroom  Home Access: Ramped entrance  Home Equipment: 4 wheeled walker   Bathroom Shower/Tub: Tub/Shower unit  Bathroom Toilet: Standard  Bathroom Equipment: Grab bars in shower, Grab bars around toilet       ADL Assistance: Independent  Homemaking Assistance: Independent  Homemaking Responsibilities: Yes (However family members will be completing after discharge)  Ambulation Assistance: Independent  Transfer Assistance: Independent    Active : Yes  Occupation: On disability  Leisure & Hobbies: Enjoys crafting  Additional Comments: Mod (I) with 4WW PTA. Hx back sx in 2015, reports ind with all ADL and IADLs with increased time before admission. VISION:Corrected - wears glasses    HEARING:  WNL    COGNITION: WFL and Decreased Safety Awareness    RANGE OF MOTION:  Bilateral Upper Extremity:  WFL    STRENGTH:  Bilateral Upper Extremity:  Not Tested due to back precautions    SENSATION:   WFL    ADL:   Lower Extremity Dressing: Stand By Assistance. pt demonstrates ability to cross feet onto knees bilaterally for LB dressing  Toilet Transfer: [address], with verbal cues  and with increased time for completion. with pt using grab bars on right and hand-held assist on left. Pt reports she has grab bars or counter available on both sides in bathroom at home. BALANCE:  Sitting Balance:  Stand By Assistance. Standing Balance: Contact Guard Assistance. with 2UE support on RW. Pt demonstrates decreased standing balance with 1 and 2 UE release. Pt maintained at least unilateral UE support at all times when standing    BED MOBILITY:  Not Tested    TRANSFERS:  Sit to Stand:  [address], with increased time for completion, with verbal cues. Stand to Sit: [address], with increased time for completion. *Pt required verbal cues to push into B feet into stool of recliner for increased ease in pushing bottom back on chair. *Pt reports the anticipation of pain has limited her the most. Educated pt in breathing techniques for increased pain management. *Pt does demonstrate decreased safety awareness when aligning self in front of chair prior to descent    FUNCTIONAL MOBILITY:  Assistive Device: 4 Wheeled Walker  Assist Level:  [address] Distance: To and from bathroom and and in room from door to window 3x  Pt with slow pace, overall good safety awareness. Exercise:  Reviewed back precautions with pt able to recall 3/3 with one visual cue.     Activity Tolerance:  Patient tolerance of  treatment: fair. Pt states she is limited by the anticipation of pain with movement. Otherwise motivated for participation in therapy and eager to return home, hoping for tomorrow. Assessment:  Assessment: 52yo presenting with the following performance deficits s/p lumbar back sx now requiring skilled OT services to maximize indep with ADL, IADL, and functional mobility tasks for return home to [facility]. Pt currently requires CGA for mobility and SBA-CGA for basic self care completion. Pt would benefit from skilled OT services throughout admission. Without skilled OT services pt is at increased risk of falls and caregiver burden. Performance deficits / Impairments: Decreased functional mobility , Decreased ADL status, Decreased endurance, Decreased strength  Prognosis: Good  REQUIRES OT FOLLOW UP: Yes    Treatment Initiated: Treatment and education initiated within context of evaluation. Evaluation time included review of current medical information, gathering information related to past medical, social and functional history, completion of standardized testing, formal and informal observation of tasks, assessment of data and development of plan of care and goals. Treatment time included skilled education and facilitation of tasks to increase safety and independence with ADL's for improved functional independence and quality of life. Discharge Recommendations:  Continue to assess pending progress (Anticipate pt will be able to discharge home with assist from family prn)    Patient Education:  OT Education: OT Role, Plan of Care, Precautions    Equipment Recommendations:  Equipment Needed: No    Plan:  Times per week: 6x  Current Treatment Recommendations: Functional Mobility Training, Endurance Training, Self-Care / ADL, Strengthening. See long-term goal time frame for expected duration of plan of care.   If no long-term goals established, a short length of stay is

## 2021-11-20 NOTE — PROGRESS NOTES
Department of Orthopedic Surgery  Spine Service  [place], [place] Progress Note        Subjective:    11/20/21  [facility] is resting in bed. Back pain as expected. Better controlled with medication. Ready to be up with therapy today. Vitals  VITALS:  BP (!) 111/59   Pulse 86   Temp 98.6 °F (37 °C) (Oral)   Resp 18   SpO2 98%   24HR INTAKE/OUTPUT:    Intake/Output Summary (Last 24 hours) at 11/20/2021 9829  Last data filed at 11/20/2021 2064  Gross per 24 hour   Intake 2669.34 ml   Output 1830 ml   Net 839.34 ml     URINARY CATHETER OUTPUT (Edgar):  Urethral Catheter-Output (mL): 1000 mL  DRAIN/TUBE OUTPUT:  Closed/Suction Drain Lateral; Left Back Accordion-Output (ml): 100 ml  Closed/Suction Drain Left; Medial Back Accordion-Output (ml): 60 ml      PHYSICAL EXAM:    Orientation:  alert and oriented to person, place and time    Incision:  dressing in place, clean, dry, intact    Lower Extremity Motor :  quadriceps, extensor hallucis longus, dorsiflexion, plantarflexion 5/5 bilaterally  Lower Extremity Sensory:  Intact L1-S1    Flatus:  negative    ABNORMAL EXAM FINDINGS:  none    LABS:    HgB:    Lab Results   Component Value Date    HGB 9.7 11/20/2021         ASSESSMENT AND PLAN:    Post operative day 1     1:  Monitor labs and drain output  2:  Activity Level:  OOB with therapy  3:  Pain Control:  Controlled  4:  Discharge Planning:  Planning home once walking well. Bowel function improves.

## 2021-11-20 NOTE — PROGRESS NOTES
Hospitalist Progress Note    Patient:  Alberto Gaona      Unit/Bed:7K-24/024-A    YOB: 1970    MRN: [id number]       Acct: [de-identified]     PCP: NEIL Amador - CNP    Date of Admission: 11/19/2021    Assessment/Plan:      Type 2 Diabetes Mellitus-Uncontrolled:          -POCT Glucose Accuchecks         -Restarted patient's home dose lantus 24 units         -Continue with as needed insulin sliding scale         --Diabetic diet    Lumbar spinal stenosis with radiculopathy s/p lumbar spinal surgery          -Management per the surgery team          -Encourage ambulation post-op          -PT/OT evaluation          -Discuss options of possible HomeHealth at discharge if able to provide post-surgery          -Continue IV cefazolin s/p surgery          -Pain medications per the primary team       Hyperlipidemia: The patient has not had a lipid panel performed in some time          -Continue atorvastatin 80 mg          -Continue fenofibrate 160 mg     History of essential hypertension: BP on the low end of normal today. We will continue to hold hydrochlorothiazide, DC IV fluids and resume lisinopril with holding parameters. History of breast cancer currently in remission:             -Continue anastrozole      Opioid-induced constipation:             -Continue Movantik            -Continue magnesium oxide            -Continue sennakot       GERD:              -Continue pantoprazole       Major depressive disorder with likely unspecified mood disorder             -Resume venlafaxine outpatient             -Continue quetiapine             -Patient not currently having any mood symptoms     9. History of asthma:               -As needed bronchodilators     10. History of COVID-19:               -Did not require hospitalization and diagnosed in September 2020     11. History of nephrolithiasis: symptom-free               -Noted and stable     12.  History of fibromyalgia Rales/Wheezes/Rhonchi. Cardiovascular: Regular rate and rhythm with normal S1/S2 without murmurs, rubs or gallops. Abdomen: Soft, non-tender, non-distended with normal bowel sounds. Extremities: no pedal edema  Skin:  no rashes    Labs:   Recent Labs     11/20/21  0544   WBC 10.9*   HGB 9.7*   HCT 30.0*        Recent Labs     11/20/21  0544      K 3.9   *   CO2 18*   BUN 11   CREATININE 0.6   CALCIUM 6.7*     No results for input(s): AST, ALT, BILIDIR, BILITOT, ALKPHOS in the last 72 hours. No results for input(s): INR in the last 72 hours. No results for input(s): Fritz Shorts in the last 72 hours. No results for input(s): PROCAL in the last 72 hours. Microbiology:      Urinalysis:      Lab Results   Component Value Date    NITRU NEGATIVE 11/19/2021    WBCUA 0-2 11/19/2021    BACTERIA NONE SEEN 11/19/2021    RBCUA 0-2 11/19/2021    BLOODU NEGATIVE 11/19/2021    SPECGRAV >1.030 11/19/2021       Radiology:  No results found.     DVT prophylaxis: [x] Lovenox                                 [] SCDs                                 [] SQ Heparin                                 [] Encourage ambulation           [] Already on Anticoagulation     Code Status: Full Code    Tele:   [x] yes             [] no    Active Hospital Problems    Diagnosis Date Noted    Spinal stenosis [M48.00] 11/20/2021    Spinal stenosis of lumbosacral region [M48.07] 11/19/2021       Electronically signed by Stephany Lynn MD on 11/20/2021 at 4:05 PM

## 2021-11-21 LAB
BASOPHILS # BLD: 0.6 %
BASOPHILS ABSOLUTE: 0.1 THOU/MM3 (ref 0–0.1)
EOSINOPHIL # BLD: 1.9 %
EOSINOPHILS ABSOLUTE: 0.2 THOU/MM3 (ref 0–0.4)
ERYTHROCYTE [DISTWIDTH] IN BLOOD BY AUTOMATED COUNT: 16.1 % (ref 11.5–14.5)
ERYTHROCYTE [DISTWIDTH] IN BLOOD BY AUTOMATED COUNT: 51.3 FL (ref 35–45)
GLUCOSE BLD-MCNC: 118 MG/DL (ref 70–108)
GLUCOSE BLD-MCNC: 132 MG/DL (ref 70–108)
GLUCOSE BLD-MCNC: 134 MG/DL (ref 70–108)
GLUCOSE BLD-MCNC: 214 MG/DL (ref 70–108)
HCT VFR BLD CALC: 31.3 % (ref 37–47)
HEMOGLOBIN: 9.9 GM/DL (ref 12–16)
IMMATURE GRANS (ABS): 0.04 THOU/MM3 (ref 0–0.07)
IMMATURE GRANULOCYTES: 0.4 %
LYMPHOCYTES # BLD: 29.8 %
LYMPHOCYTES ABSOLUTE: 2.7 THOU/MM3 (ref 1–4.8)
MCH RBC QN AUTO: 27.5 PG (ref 26–33)
MCHC RBC AUTO-ENTMCNC: 31.6 GM/DL (ref 32.2–35.5)
MCV RBC AUTO: 86.9 FL (ref 81–99)
MONOCYTES # BLD: 9.2 %
MONOCYTES ABSOLUTE: 0.8 THOU/MM3 (ref 0.4–1.3)
NUCLEATED RED BLOOD CELLS: 0 /100 WBC
PLATELET # BLD: 134 THOU/MM3 (ref 130–400)
PMV BLD AUTO: 12.6 FL (ref 9.4–12.4)
RBC # BLD: 3.6 MILL/MM3 (ref 4.2–5.4)
SEG NEUTROPHILS: 58.1 %
SEGMENTED NEUTROPHILS ABSOLUTE COUNT: 5.2 THOU/MM3 (ref 1.8–7.7)
WBC # BLD: 8.9 THOU/MM3 (ref 4.8–10.8)

## 2021-11-21 PROCEDURE — 6370000000 HC RX 637 (ALT 250 FOR IP): Performed by: HOSPITALIST

## 2021-11-21 PROCEDURE — 99232 SBSQ HOSP IP/OBS MODERATE 35: CPT | Performed by: HOSPITALIST

## 2021-11-21 PROCEDURE — 1200000000 HC SEMI PRIVATE

## 2021-11-21 PROCEDURE — 82948 REAGENT STRIP/BLOOD GLUCOSE: CPT

## 2021-11-21 PROCEDURE — 85025 COMPLETE CBC W/AUTO DIFF WBC: CPT

## 2021-11-21 PROCEDURE — 6370000000 HC RX 637 (ALT 250 FOR IP): Performed by: PHYSICIAN ASSISTANT

## 2021-11-21 PROCEDURE — 36415 COLL VENOUS BLD VENIPUNCTURE: CPT

## 2021-11-21 PROCEDURE — 6370000000 HC RX 637 (ALT 250 FOR IP): Performed by: STUDENT IN AN ORGANIZED HEALTH CARE EDUCATION/TRAINING PROGRAM

## 2021-11-21 PROCEDURE — 2580000003 HC RX 258: Performed by: PHYSICIAN ASSISTANT

## 2021-11-21 RX ADMIN — ANASTROZOLE 1 MG: 1 TABLET, COATED ORAL at 20:20

## 2021-11-21 RX ADMIN — OXYCODONE HYDROCHLORIDE AND ACETAMINOPHEN 2 TABLET: 5; 325 TABLET ORAL at 01:33

## 2021-11-21 RX ADMIN — SODIUM CHLORIDE, PRESERVATIVE FREE 10 ML: 5 INJECTION INTRAVENOUS at 12:47

## 2021-11-21 RX ADMIN — NALOXEGOL OXALATE 12.5 MG: 12.5 TABLET, FILM COATED ORAL at 09:23

## 2021-11-21 RX ADMIN — DOCUSATE SODIUM 50 MG AND SENNOSIDES 8.6 MG 2 TABLET: 8.6; 5 TABLET, FILM COATED ORAL at 20:19

## 2021-11-21 RX ADMIN — ATORVASTATIN CALCIUM 80 MG: 80 TABLET, FILM COATED ORAL at 20:20

## 2021-11-21 RX ADMIN — PANTOPRAZOLE SODIUM 40 MG: 40 TABLET, DELAYED RELEASE ORAL at 06:22

## 2021-11-21 RX ADMIN — CYCLOBENZAPRINE 10 MG: 10 TABLET, FILM COATED ORAL at 14:41

## 2021-11-21 RX ADMIN — DOCUSATE SODIUM 50 MG AND SENNOSIDES 8.6 MG 2 TABLET: 8.6; 5 TABLET, FILM COATED ORAL at 10:13

## 2021-11-21 RX ADMIN — CYCLOBENZAPRINE 10 MG: 10 TABLET, FILM COATED ORAL at 06:31

## 2021-11-21 RX ADMIN — INSULIN GLARGINE 24 UNITS: 100 INJECTION, SOLUTION SUBCUTANEOUS at 09:20

## 2021-11-21 RX ADMIN — CETIRIZINE HYDROCHLORIDE 10 MG: 10 TABLET, FILM COATED ORAL at 09:16

## 2021-11-21 RX ADMIN — OXYCODONE HYDROCHLORIDE AND ACETAMINOPHEN 2 TABLET: 5; 325 TABLET ORAL at 17:06

## 2021-11-21 RX ADMIN — POLYETHYLENE GLYCOL 3350 17 G: 17 POWDER, FOR SOLUTION ORAL at 09:17

## 2021-11-21 RX ADMIN — OXYCODONE HYDROCHLORIDE AND ACETAMINOPHEN 2 TABLET: 5; 325 TABLET ORAL at 06:31

## 2021-11-21 RX ADMIN — Medication 250 MG: at 09:22

## 2021-11-21 RX ADMIN — OXYCODONE HYDROCHLORIDE AND ACETAMINOPHEN 2 TABLET: 5; 325 TABLET ORAL at 22:12

## 2021-11-21 RX ADMIN — SODIUM CHLORIDE, PRESERVATIVE FREE 10 ML: 5 INJECTION INTRAVENOUS at 20:20

## 2021-11-21 RX ADMIN — POTASSIUM CHLORIDE 20 MEQ: 750 TABLET, EXTENDED RELEASE ORAL at 20:19

## 2021-11-21 RX ADMIN — FENOFIBRATE 160 MG: 160 TABLET ORAL at 20:20

## 2021-11-21 RX ADMIN — CYCLOBENZAPRINE 10 MG: 10 TABLET, FILM COATED ORAL at 20:19

## 2021-11-21 RX ADMIN — QUETIAPINE FUMARATE 50 MG: 50 TABLET, EXTENDED RELEASE ORAL at 20:20

## 2021-11-21 RX ADMIN — ANASTROZOLE 1 MG: 1 TABLET, COATED ORAL at 01:33

## 2021-11-21 RX ADMIN — LISINOPRIL 5 MG: 5 TABLET ORAL at 09:21

## 2021-11-21 RX ADMIN — OXYCODONE HYDROCHLORIDE AND ACETAMINOPHEN 2 TABLET: 5; 325 TABLET ORAL at 12:45

## 2021-11-21 ASSESSMENT — PAIN DESCRIPTION - ONSET
ONSET: ON-GOING
ONSET: ON-GOING

## 2021-11-21 ASSESSMENT — PAIN DESCRIPTION - ORIENTATION
ORIENTATION: LOWER
ORIENTATION: LOWER

## 2021-11-21 ASSESSMENT — PAIN SCALES - GENERAL
PAINLEVEL_OUTOF10: 4
PAINLEVEL_OUTOF10: 9
PAINLEVEL_OUTOF10: 9
PAINLEVEL_OUTOF10: 8
PAINLEVEL_OUTOF10: 7
PAINLEVEL_OUTOF10: 9
PAINLEVEL_OUTOF10: 8
PAINLEVEL_OUTOF10: 7

## 2021-11-21 ASSESSMENT — PAIN DESCRIPTION - DESCRIPTORS
DESCRIPTORS: SORE;ACHING
DESCRIPTORS: ACHING;SORE

## 2021-11-21 ASSESSMENT — PAIN DESCRIPTION - PROGRESSION
CLINICAL_PROGRESSION: NOT CHANGED
CLINICAL_PROGRESSION: GRADUALLY WORSENING
CLINICAL_PROGRESSION: GRADUALLY WORSENING

## 2021-11-21 ASSESSMENT — PAIN DESCRIPTION - LOCATION
LOCATION: BACK
LOCATION: BACK

## 2021-11-21 ASSESSMENT — PAIN DESCRIPTION - FREQUENCY
FREQUENCY: CONTINUOUS
FREQUENCY: CONTINUOUS

## 2021-11-21 ASSESSMENT — PAIN - FUNCTIONAL ASSESSMENT: PAIN_FUNCTIONAL_ASSESSMENT: PREVENTS OR INTERFERES SOME ACTIVE ACTIVITIES AND ADLS

## 2021-11-21 ASSESSMENT — PAIN DESCRIPTION - PAIN TYPE
TYPE: SURGICAL PAIN
TYPE: SURGICAL PAIN

## 2021-11-21 NOTE — PROGRESS NOTES
Hospitalist Progress Note    Patient:  Dominic Trinidad      Unit/Bed:7K-24/024-A    YOB: 1970    MRN: [id number]       Acct: [de-identified]     PCP: NEIL Tomas CNP    Date of Admission: 11/19/2021    Assessment/Plan:      Type 2 Diabetes Mellitus-Uncontrolled:          -POCT Glucose Accuchecks         -Continue with Lantus         -Continue with as needed insulin sliding scale         --Diabetic diet    Lumbar spinal stenosis with radiculopathy s/p lumbar spinal surgery          -Management per the surgery team          -Encourage ambulation post-op          -PT/OT evaluation          -Discuss options of possible HomeHealth at discharge if able to provide post-surgery          -Continue IV cefazolin s/p surgery          -Pain medications per the primary team       Hyperlipidemia: The patient has not had a lipid panel performed in some time          -Continue atorvastatin 80 mg          -Continue fenofibrate 160 mg     History of essential hypertension: BP on the low end of normal today. Okay to continue with lisinopril. We will also restart her hydrochlorothiazide. History of breast cancer currently in remission:             -Continue anastrozole      Opioid-induced constipation:             -Continue Movantik            -Continue magnesium oxide            -Continue sennakot      Disposition: Discharge planning per primary. Hospitalist service signing off please call with any questions. Subjective (past 24 hours):     Patient seen and examined at bedside, states she is doing well. She reports pain is better today and is ambulating better. No new complaints or acute overnight events.       Medications:  Reviewed    Infusion Medications    sodium chloride      dextrose       Scheduled Medications    anastrozole  1 mg Oral Nightly    atorvastatin  80 mg Oral Nightly    fenofibrate  160 mg Oral Daily    [Held by provider] hydroCHLOROthiazide  25 mg Oral Daily    lisinopril  5 mg Oral Daily    cetirizine  10 mg Oral Daily    Magnesium Oxide  250 mg Oral Daily    pantoprazole  40 mg Oral QAM AC    potassium chloride  20 mEq Oral Nightly    QUEtiapine  50 mg Oral Nightly    sennosides-docusate sodium  2 tablet Oral BID    sodium chloride flush  10 mL IntraVENous 2 times per day    polyethylene glycol  17 g Oral Daily    naloxegol  12.5 mg Oral QAM    insulin glargine  24 Units SubCUTAneous QAM    insulin lispro  0-12 Units SubCUTAneous TID WC    insulin lispro  0-6 Units SubCUTAneous Nightly     PRN Meds: albuterol sulfate HFA, albuterol, sodium chloride flush, sodium chloride, acetaminophen, cyclobenzaprine, bisacodyl, fleet, ondansetron **OR** ondansetron, oxyCODONE-acetaminophen **OR** oxyCODONE-acetaminophen, glucose, dextrose, glucagon (rDNA), dextrose      Intake/Output Summary (Last 24 hours) at 11/21/2021 1314  Last data filed at 11/21/2021 1225  Gross per 24 hour   Intake 3263.85 ml   Output 1800 ml   Net 1463.85 ml       Diet:  ADULT DIET; Regular; 3 carb choices (45 gm/meal)    Exam:  /63   Pulse 84   Temp 98.4 °F (36.9 °C) (Oral)   Resp 18   SpO2 97%     General appearance: No apparent distress, appears stated age and cooperative. Respiratory:  Normal respiratory effort. Clear to auscultation, bilaterally without Rales/Wheezes/Rhonchi. Cardiovascular: Regular rate and rhythm with normal S1/S2 without murmurs, rubs or gallops. Abdomen: Soft, non-tender, non-distended with normal bowel sounds. Extremities: no pedal edema  Skin:  no rashes    Labs:   Recent Labs     11/20/21  0544 11/21/21  0613   WBC 10.9* 8.9   HGB 9.7* 9.9*   HCT 30.0* 31.3*    134     Recent Labs     11/20/21  0544      K 3.9   *   CO2 18*   BUN 11   CREATININE 0.6   CALCIUM 6.7*     No results for input(s): AST, ALT, BILIDIR, BILITOT, ALKPHOS in the last 72 hours. No results for input(s): INR in the last 72 hours.   No results for input(s): CKTOTAL, TROPONINI in the last 72 hours. No results for input(s): PROCAL in the last 72 hours. Microbiology:      Urinalysis:      Lab Results   Component Value Date    NITRU NEGATIVE 11/19/2021    WBCUA 0-2 11/19/2021    BACTERIA NONE SEEN 11/19/2021    RBCUA 0-2 11/19/2021    BLOODU NEGATIVE 11/19/2021    SPECGRAV >1.030 11/19/2021       Radiology:  No results found.     DVT prophylaxis: [x] Lovenox                                 [] SCDs                                 [] SQ Heparin                                 [] Encourage ambulation           [] Already on Anticoagulation     Code Status: Full Code    Tele:   [x] yes             [] no    Active Hospital Problems    Diagnosis Date Noted    Spinal stenosis [M48.00] 11/20/2021    Spinal stenosis of lumbosacral region [M48.07] 11/19/2021       Electronically signed by Anton Osullivan MD on 11/21/2021 at 1:14 PM

## 2021-11-21 NOTE — PROGRESS NOTES
Department of Orthopedic Surgery  Spine Service  [facility] [place], [place] Progress Note        Subjective:    11/20/21  Maryanne Mendosa is resting in bed. Back pain as expected. Better controlled with medication. Ready to be up with therapy today. 11/21/21  Bebe is resting in bed. She is doing well. Walked well with therapy. +bowel sounds/flatus. Working with therapy. Planning home once walking well, bowel function improves. Vitals  VITALS:  /65   Pulse 85   Temp 99 °F (37.2 °C) (Oral)   Resp 18   SpO2 96%   24HR INTAKE/OUTPUT:      Intake/Output Summary (Last 24 hours) at 11/21/2021 1104  Last data filed at 11/21/2021 0546  Gross per 24 hour   Intake 3463.85 ml   Output 2070 ml   Net 1393.85 ml     URINARY CATHETER OUTPUT (Edgar):  [REMOVED] Urethral Catheter-Output (mL): 750 mL  DRAIN/TUBE OUTPUT:  Closed/Suction Drain Lateral; Left Back Accordion-Output (ml): 50 ml  Closed/Suction Drain Left; Medial Back Accordion-Output (ml): 10 ml      PHYSICAL EXAM:    Orientation:  alert and oriented to person, place and time    Incision:  dressing in place, clean, dry, intact    Lower Extremity Motor :  quadriceps, extensor hallucis longus, dorsiflexion, plantarflexion 5/5 bilaterally  Lower Extremity Sensory:  Intact L1-S1    Flatus:  negative    ABNORMAL EXAM FINDINGS:  none    LABS:    HgB:    Lab Results   Component Value Date    HGB 9.9 11/21/2021         ASSESSMENT AND PLAN:    Post operative day 2     1:  Monitor labs and drain output  2:  Activity Level:  OOB with therapy  3:  Pain Control:  Controlled  4:  Discharge Planning:  Planning home once walking well. Bowel function improves.

## 2021-11-22 VITALS
DIASTOLIC BLOOD PRESSURE: 63 MMHG | OXYGEN SATURATION: 96 % | HEART RATE: 90 BPM | SYSTOLIC BLOOD PRESSURE: 120 MMHG | TEMPERATURE: 98.4 F | RESPIRATION RATE: 18 BRPM

## 2021-11-22 LAB
BASOPHILS # BLD: 0.8 %
BASOPHILS ABSOLUTE: 0.1 THOU/MM3 (ref 0–0.1)
EOSINOPHIL # BLD: 3.4 %
EOSINOPHILS ABSOLUTE: 0.3 THOU/MM3 (ref 0–0.4)
ERYTHROCYTE [DISTWIDTH] IN BLOOD BY AUTOMATED COUNT: 15.7 % (ref 11.5–14.5)
ERYTHROCYTE [DISTWIDTH] IN BLOOD BY AUTOMATED COUNT: 49.1 FL (ref 35–45)
GLUCOSE BLD-MCNC: 129 MG/DL (ref 70–108)
GLUCOSE BLD-MCNC: 166 MG/DL (ref 70–108)
HCT VFR BLD CALC: 29.5 % (ref 37–47)
HEMOGLOBIN: 9.6 GM/DL (ref 12–16)
IMMATURE GRANS (ABS): 0.02 THOU/MM3 (ref 0–0.07)
IMMATURE GRANULOCYTES: 0.3 %
LYMPHOCYTES # BLD: 37 %
LYMPHOCYTES ABSOLUTE: 2.7 THOU/MM3 (ref 1–4.8)
MCH RBC QN AUTO: 27.9 PG (ref 26–33)
MCHC RBC AUTO-ENTMCNC: 32.5 GM/DL (ref 32.2–35.5)
MCV RBC AUTO: 85.8 FL (ref 81–99)
MONOCYTES # BLD: 10.3 %
MONOCYTES ABSOLUTE: 0.8 THOU/MM3 (ref 0.4–1.3)
NUCLEATED RED BLOOD CELLS: 0 /100 WBC
PLATELET # BLD: 135 THOU/MM3 (ref 130–400)
PMV BLD AUTO: 12.5 FL (ref 9.4–12.4)
RBC # BLD: 3.44 MILL/MM3 (ref 4.2–5.4)
SEG NEUTROPHILS: 48.2 %
SEGMENTED NEUTROPHILS ABSOLUTE COUNT: 3.6 THOU/MM3 (ref 1.8–7.7)
WBC # BLD: 7.4 THOU/MM3 (ref 4.8–10.8)

## 2021-11-22 PROCEDURE — 2580000003 HC RX 258: Performed by: PHYSICIAN ASSISTANT

## 2021-11-22 PROCEDURE — 6370000000 HC RX 637 (ALT 250 FOR IP): Performed by: HOSPITALIST

## 2021-11-22 PROCEDURE — 97535 SELF CARE MNGMENT TRAINING: CPT

## 2021-11-22 PROCEDURE — 85025 COMPLETE CBC W/AUTO DIFF WBC: CPT

## 2021-11-22 PROCEDURE — 36415 COLL VENOUS BLD VENIPUNCTURE: CPT

## 2021-11-22 PROCEDURE — 6370000000 HC RX 637 (ALT 250 FOR IP): Performed by: STUDENT IN AN ORGANIZED HEALTH CARE EDUCATION/TRAINING PROGRAM

## 2021-11-22 PROCEDURE — 6370000000 HC RX 637 (ALT 250 FOR IP): Performed by: PHYSICIAN ASSISTANT

## 2021-11-22 PROCEDURE — 97530 THERAPEUTIC ACTIVITIES: CPT

## 2021-11-22 PROCEDURE — 97110 THERAPEUTIC EXERCISES: CPT

## 2021-11-22 PROCEDURE — 82948 REAGENT STRIP/BLOOD GLUCOSE: CPT

## 2021-11-22 RX ORDER — CYCLOBENZAPRINE HCL 10 MG
10 TABLET ORAL 3 TIMES DAILY PRN
Qty: 50 TABLET | Refills: 0 | Status: SHIPPED | OUTPATIENT
Start: 2021-11-22

## 2021-11-22 RX ORDER — OXYCODONE HYDROCHLORIDE AND ACETAMINOPHEN 5; 325 MG/1; MG/1
1 TABLET ORAL EVERY 4 HOURS PRN
Qty: 42 TABLET | Refills: 0 | Status: SHIPPED | OUTPATIENT
Start: 2021-11-22 | End: 2021-11-29

## 2021-11-22 RX ADMIN — CYCLOBENZAPRINE 10 MG: 10 TABLET, FILM COATED ORAL at 04:09

## 2021-11-22 RX ADMIN — OXYCODONE HYDROCHLORIDE AND ACETAMINOPHEN 2 TABLET: 5; 325 TABLET ORAL at 04:09

## 2021-11-22 RX ADMIN — LISINOPRIL 5 MG: 5 TABLET ORAL at 08:45

## 2021-11-22 RX ADMIN — CETIRIZINE HYDROCHLORIDE 10 MG: 10 TABLET, FILM COATED ORAL at 08:45

## 2021-11-22 RX ADMIN — INSULIN GLARGINE 24 UNITS: 100 INJECTION, SOLUTION SUBCUTANEOUS at 09:20

## 2021-11-22 RX ADMIN — HYDROCHLOROTHIAZIDE 25 MG: 25 TABLET ORAL at 08:45

## 2021-11-22 RX ADMIN — PANTOPRAZOLE SODIUM 40 MG: 40 TABLET, DELAYED RELEASE ORAL at 05:00

## 2021-11-22 RX ADMIN — SODIUM CHLORIDE, PRESERVATIVE FREE 10 ML: 5 INJECTION INTRAVENOUS at 08:45

## 2021-11-22 RX ADMIN — VENLAFAXINE HYDROCHLORIDE 225 MG: 150 CAPSULE, EXTENDED RELEASE ORAL at 00:22

## 2021-11-22 RX ADMIN — OXYCODONE HYDROCHLORIDE AND ACETAMINOPHEN 2 TABLET: 5; 325 TABLET ORAL at 13:20

## 2021-11-22 RX ADMIN — OXYCODONE HYDROCHLORIDE AND ACETAMINOPHEN 2 TABLET: 5; 325 TABLET ORAL at 08:46

## 2021-11-22 RX ADMIN — POLYETHYLENE GLYCOL 3350 17 G: 17 POWDER, FOR SOLUTION ORAL at 08:47

## 2021-11-22 RX ADMIN — DOCUSATE SODIUM 50 MG AND SENNOSIDES 8.6 MG 2 TABLET: 8.6; 5 TABLET, FILM COATED ORAL at 08:46

## 2021-11-22 RX ADMIN — Medication 250 MG: at 08:45

## 2021-11-22 RX ADMIN — CYCLOBENZAPRINE 10 MG: 10 TABLET, FILM COATED ORAL at 11:39

## 2021-11-22 RX ADMIN — NALOXEGOL OXALATE 12.5 MG: 12.5 TABLET, FILM COATED ORAL at 08:45

## 2021-11-22 ASSESSMENT — PAIN SCALES - GENERAL
PAINLEVEL_OUTOF10: 7
PAINLEVEL_OUTOF10: 7
PAINLEVEL_OUTOF10: 8
PAINLEVEL_OUTOF10: 7
PAINLEVEL_OUTOF10: 8
PAINLEVEL_OUTOF10: 7
PAINLEVEL_OUTOF10: 8

## 2021-11-22 ASSESSMENT — PAIN - FUNCTIONAL ASSESSMENT: PAIN_FUNCTIONAL_ASSESSMENT: ACTIVITIES ARE NOT PREVENTED

## 2021-11-22 ASSESSMENT — PAIN DESCRIPTION - ONSET: ONSET: ON-GOING

## 2021-11-22 ASSESSMENT — PAIN DESCRIPTION - PROGRESSION
CLINICAL_PROGRESSION: NOT CHANGED
CLINICAL_PROGRESSION: GRADUALLY IMPROVING

## 2021-11-22 ASSESSMENT — PAIN DESCRIPTION - ORIENTATION: ORIENTATION: POSTERIOR;LOWER

## 2021-11-22 ASSESSMENT — PAIN DESCRIPTION - DESCRIPTORS: DESCRIPTORS: SHARP

## 2021-11-22 ASSESSMENT — PAIN DESCRIPTION - LOCATION: LOCATION: BACK

## 2021-11-22 ASSESSMENT — PAIN DESCRIPTION - FREQUENCY: FREQUENCY: CONTINUOUS

## 2021-11-22 ASSESSMENT — PAIN DESCRIPTION - PAIN TYPE: TYPE: SURGICAL PAIN

## 2021-11-22 NOTE — PROGRESS NOTES
[address] [address]  INPATIENT PHYSICAL THERAPY  DAILY NOTE  [facility] ORTHOPEDICS 7K - 6Q-98/727-R    Time In: 3341  Time Out: 4080  Timed Code Treatment Minutes: 18 Minutes  Minutes: 18          Date: 2021  Patient Name: Sidra Diaz,  Gender:  female        MRN: [id number]  : 1970  (46 y.o.)     Referring Practitioner: ESCOBAR Solomon  Diagnosis: spinal stenosis of lumbosacral region  Additional Pertinent Hx: admit with above diagnosis, s/pREMOVAL OF HARDWARE L5-S1, LAMINECTOMY L4-L5, PSF, L4-S1 on 21     Prior Level of Function:  Lives With: Daughter, Other (comment) (Ex-, daughter, DIONNE, and 3 grandchildren)  Type of Home: House  Home Layout: One level, Able to Live on Main level with bedroom/bathroom  Home Access: Ramped entrance  Home Equipment: 4 wheeled walker   Bathroom Shower/Tub: Tub/Shower unit  Bathroom Toilet: Standard  Bathroom Equipment: Grab bars in shower, Grab bars around toilet    ADL Assistance: [facility]: Independent  Homemaking Responsibilities: Yes (However family members will be completing after discharge)  Ambulation Assistance: Independent  Transfer Assistance: Independent  Active : Yes  Additional Comments: Mod (I) with 4WW PTA. Hx back sx in 2015, reports ind with all ADL and IADLs with increased time before admission. Restrictions/Precautions:  Restrictions/Precautions: General Precautions, Fall Risk  Required Braces or Orthoses  Spinal: Lumbar Corset  Position Activity Restriction  Spinal Precautions: No Bending, No Lifting, No Twisting     SUBJECTIVE: RN approved session. Patient laying in bed upon arrival and agreeable to therapy. Patient requested to use restroom during session.      PAIN: 6-7/10: back and RLE    Vitals: Vitals not assessed per clinical judgement, see nursing flowsheet    OBJECTIVE:  Bed Mobility:  Supine to Sit: Supervision, with head of bed raised, with increased time for completion, good technique  Scooting: Modified Independent, to edge of bed    Transfers:  Sit to Stand: Supervision  Stand to Sit:Supervision    Ambulation:  Stand By Assistance  Distance: 15ft, 6ft, 150ft  Surface: Level Tile  Device:Rolling Walker  Gait Deviations:  Slow Tyesha, Decreased Gait Speed, Decreased Heel Strike Bilaterally and steady, no LOB    Balance:  Dynamic Standing Balance: Supervision    Exercise:  Patient was guided in 1 set(s) 10 reps of exercise to left lower extremities. Ankle pumps, B Glut sets, B Quad sets, Heelslides, Hip abduction/adduction, B Seated marches, Long arc quads and B Seated isometric hip adduction. Exercises were completed for increased independence with functional mobility. **performed majority of exercises on L LE only due to noted increased pain when performed with RLE    Functional Outcome Measures: Completed  AM-PAC Inpatient Mobility Raw Score : 18  AM-PAC Inpatient T-Scale Score : 43.63    ASSESSMENT:  Assessment: Patient progressing toward established goals. Activity Tolerance:  Patient tolerance of  treatment: good.       Equipment Recommendations:Equipment Needed: No  Discharge Recommendations: Home with Home Health PT and Patient would benefit from continued PT at discharge  Plan: Times per week: 6X O  Times per day: Daily  Specific instructions for Next Treatment: therex and mobility with back precautions    Patient Education  Patient Education: Plan of Care, Home Exercise Program, Precautions/Restrictions, Bed Mobility, Transfers, Gait, Up in Chair for All Meals, Verbal Exercise Instruction    Goals:  Patient goals : go home tomorrow  Short term goals  Time Frame for Short term goals: by discharge  Short term goal 1: bed mobility with S to get in/out of bed  Short term goal 2: transfer with S to get in/out of chairs  Short term goal 3: amb >100'x1 with walker and S to walk safely in home  Long term goals  Time Frame for Long term goals : no LTGs set secondary to short ELOS    Following session, patient left in safe position with all fall risk precautions in place.

## 2021-11-22 NOTE — PROGRESS NOTES
Department of Orthopedic Surgery  Spine Service  [place], [place] Progress Note        Subjective:    11/20/21  Deric Sterling is resting in bed. Back pain as expected. Better controlled with medication. Ready to be up with therapy today. 11/21/21  Bebe is resting in bed. She is doing well. Walked well with therapy. +bowel sounds/flatus. Working with therapy. Planning home once walking well, bowel function improves. 11/22/21  Bebe is resting in bed. Walking well with therapy. Pain is controlled. Passing gas with +bowel sounds. Wanting to go home today. Vitals  VITALS:  /60   Pulse 89   Temp 98.4 °F (36.9 °C) (Oral)   Resp 16   SpO2 100%   24HR INTAKE/OUTPUT:      Intake/Output Summary (Last 24 hours) at 11/22/2021 0758  Last data filed at 11/22/2021 0458  Gross per 24 hour   Intake 1445 ml   Output 1822.5 ml   Net -377.5 ml     URINARY CATHETER OUTPUT (Edgar):  [REMOVED] Urethral Catheter-Output (mL): 750 mL  DRAIN/TUBE OUTPUT:  Closed/Suction Drain Lateral; Left Back Accordion-Output (ml): 30 ml  Closed/Suction Drain Left; Medial Back Accordion-Output (ml): 10 ml      PHYSICAL EXAM:    Orientation:  alert and oriented to person, place and time    Incision:  dressing in place, clean, dry, intact    Lower Extremity Motor :  quadriceps, extensor hallucis longus, dorsiflexion, plantarflexion 5/5 bilaterally  Lower Extremity Sensory:  Intact L1-S1    Flatus:  negative    ABNORMAL EXAM FINDINGS:  none    LABS:    HgB:    Lab Results   Component Value Date    HGB 9.6 11/22/2021         ASSESSMENT AND PLAN:    Post operative day 3    1:  Monitor labs and drain output  2:  Activity Level:  OOB with therapy  3:  Pain Control:  Controlled  4:  Discharge Planning:  Planning home today with [place] for drains.

## 2021-11-22 NOTE — PROGRESS NOTES
[facility]  Occupational Therapy  Daily Note  Time:   Time In:   Time Out: 1112  Timed Code Treatment Minutes: 27 Minutes  Minutes: 27    Date: 2021  Patient Name: Rachel Ray,   Gender: female      Room: -24/024-A  MRN: [id number]  : 1970  (46 y.o.)  Referring Practitioner: Bertha Lynn PA-C  Diagnosis: Spinal stenosis of lumbosacral region  Additional Pertinent Hx: Pt is s/p REMOVAL OF HARDWARE L5-S1, LAMINECTOMY L4-L5, PSF, L4-S1 on 21 by Dr. Eliseo Munoz. Restrictions/Precautions:  Restrictions/Precautions: General Precautions, Fall Risk  Required Braces or Orthoses  Spinal: Lumbar Corset  Position Activity Restriction  Spinal Precautions: No Bending, No Lifting, No Twisting     SUBJECTIVE: RN approved OT session, states pt to be discharged home today. Pt resting in bed upon arrival, agreeable to participating in OT session. PAIN: 7/10: low back    Vitals: Vitals not assessed per clinical judgement. COGNITION: WFL    ADL:   Grooming: Supervision. for hand hygiene standing sinkside with good adherence to back precautions, no vcs needed  Upper Extremity Dressing: Supervision. to don shirt while standing EOB  Lower Extremity Dressing: Supervision. to don socks, underwear, and pants sitting EOB using figure 4 technique. Pt demonstrates good adherence to back precautions without cuing  Toileting: Supervision. Toilet Transfer: Supervision. *Including management of drains throughout tasks    Educated pt on use of reacher to assist with picking up items off the ground, in addition to golfer's bend and bending at the knees, with pt agreeable. Pt states she has family members who order off [employer] and plans to order a reacher from there. BALANCE:  Sitting Balance:  Supervision. at EOB during dressing task  Standing Balance: Supervision.       BED MOBILITY:  R sidelying to Sit: Supervision, with head of bed raised, with rail  Sit to R no cues for safety to increase indep in accessing home environment. Following session, patient left in safe position with all fall risk precautions in place.

## 2021-11-22 NOTE — CARE COORDINATION
11/22/21, 9:08 AM EST  DISCHARGE PLANNING EVALUATION:    Isaías Eason       Admitted: 11/19/2021/ [address] day: 2   Location: [address] [place] [address] Reason for admit: Spinal stenosis of lumbosacral region [M48.07]  Spinal stenosis [M48.00]   PMH:  has a past medical history of Arthritis, Asthma, Cancer ([address] Chronic back pain, COVID-19, Depression, Fibromyalgia, History of blood transfusion, History of kidney stones, Hyperlipidemia, Hypertension, Osteopenia, and Type II or unspecified type diabetes mellitus without mention of complication, not stated as uncontrolled. Procedure: 11/19/21 surgery by Dr Catie Pulido: REMOVAL OF HARDWARE L5-S1, LAMINECTOMY L4-L5, PSF, L4-S1  Barriers to Discharge:  PT/OT. Pain management. N/V checks. I&O. Ileus prevention measures. Monitor wound drain output. PCP: NEIL Casillas CNP  Readmission Risk Score: 7.7 ( )%    Patient Goals/Plan/Treatment Preferences: I spoke with Brooke Baker. She is planning to go home with her  today. Shower chair ordered from [facility] per patient request. She has other DME. [facility] HH has limited staffing. She requested [facility] for wound drain management - I called referral. [facility] can provide service and start care tomorrow. Transportation/Food Security/Housekeeping Addressed:  No issues identified. 11/22/21, 10:03 AM EST    Patient goals/plan/ treatment preferences discussed by  and . Patient goals/plan/ treatment preferences reviewed with patient/ family. Patient/ family verbalize understanding of discharge plan and are in agreement with goal/plan/treatment preferences. Understanding was demonstrated using the teach back method. AVS provided by RN at time of discharge, which includes all necessary medical information pertaining to the patients current course of illness, treatment, post-discharge goals of care, and treatment preferences.     Services After Discharge  Services At/After Discharge: Nursing Services

## 2021-12-05 NOTE — DISCHARGE SUMMARY
[address] [place] [address]                               DISCHARGE SUMMARY    PATIENT NAME: Trip Ayala                :        1970  MED REC NO:   [id number]                           ROOM:       [id number]  ACCOUNT NO:   [de-identified]                           ADMIT DATE: 2021  PROVIDER:     Birgit Joy PA-C                    [address] [place] [address] DATE: 2021    DISCHARGE DIAGNOSIS:  Lumbar spinal stenosis L4-L5 with status post  previous instrumentation L5-S1 completed in the year . OPERATION PERFORMED:  On 2021 included removal of hardware L5-S1,  L4-L5 lumbar decompressive laminectomy and fusion with interbody fusion  and L4-S1 posterior spinal fusion with allograft bone and  instrumentation. HOSPITAL COURSE:  The patient is a 70-year-old female who presented to  our office with complaints of significant low back pain and predominant  right lower extremity pain. She had failed to improve despite  conservative management and ultimately elected to undergo operative  management. She underwent surgery at [facility] on the  date of 2021 after which she was admitted to floor 7 for  continued monitoring and care. On her first day postoperatively, she  showed improvement of her preoperative leg radicular pain. She was able  to walk well with physical therapy and continued to mobilize  progressively. She was doing continually better and on postoperative  day #3, she was able to discharge home. She was discharged home with a  drain in place and dressing intact with an order for home health for  continued management of these drains at home. DISCHARGE MEDICATIONS:  Include,  1. Percocet. 2.  Flexeril. 3.  Colace. DISCHARGE INSTRUCTIONS:  Include no heavy lifting, bending or twisting. She will take short frequent walks with the use of a back brace and  walker and no driving for two weeks. We will see her back in the office  in two weeks' time for repeat evaluation with lumbar spine x-rays,  suture removal and to see how she is coming on clinically in her  recovery. Consultations were made were to the Hospital Medicine Service  for management of uncontrolled type 2 diabetes. DISCHARGE DISPOSITION:  Home with home health. DISCHARGE CONDITION:  Stable.         Dianna Smith    D: 12/05/2021 10:58:04       T: 12/05/2021 11:00:20     [email or web address]  Job#: [id number]     Doc#: [id number]    CC:  Jacquie Roe CNP

## (undated) DEVICE — SET ADMIN 25ML L117IN PMP MOD CK VLV RLER CLMP 2 SMRTSITE

## (undated) DEVICE — CATHETER ETER IV 22GA L1IN POLYUR STR RADPQ INTROCAN SFTY

## (undated) DEVICE — HEAD POSITIONER, SURGICAL: Brand: DEROYAL

## (undated) DEVICE — BASIC SINGLE BASIN BTC-LF: Brand: MEDLINE INDUSTRIES, INC.

## (undated) DEVICE — ROYAL SILK SURGICAL GOWN, XXL: Brand: CONVERTORS

## (undated) DEVICE — PROBE STIM 3 MM FOR PEDCL SCREW DISP

## (undated) DEVICE — SUTURE ABSRB BRAID COAT UD CP NO 2 27IN VCRL J195H

## (undated) DEVICE — SUTURE VCRL + SZ 2-0 L27IN ABSRB UD CP-1 1/2 CIR REV CUT VCP266H

## (undated) DEVICE — PAD OP RM W20XL72XH2IN PRECIS CUT FLAT EC BIOCLINIC

## (undated) DEVICE — PACK-MAJOR

## (undated) DEVICE — KIT EVAC 400CC PVC RADPQ Y CONN

## (undated) DEVICE — SPONGE LAP W18XL18IN WHT COT 4 PLY FLD STRUNG RADPQ DISP ST

## (undated) DEVICE — GLOVE ORANGE PI 8 1/2   MSG9085

## (undated) DEVICE — BIPOLAR SEALER 23-112-1 AQM 6.0: Brand: AQUAMANTYS ®

## (undated) DEVICE — DRAIN SURG 10FR PVC TB W/ TRCR MID PERF NO RESVR HUBLESS

## (undated) DEVICE — GOWN,SIRUS,NONRNF,SETINSLV,XL,20/CS: Brand: MEDLINE

## (undated) DEVICE — IV START KIT: Brand: MEDLINE INDUSTRIES, INC.

## (undated) DEVICE — SOLUTION IV 1000ML 0.45% SOD CHL PH 5 INJ USP VIAFLX PLAS

## (undated) DEVICE — CODMAN® SURGICAL PATTIES 1" X 1" (2.54CM X 2.54CM): Brand: CODMAN®

## (undated) DEVICE — BLADE ES L6IN ELASTOMERIC COAT EXT DURABLE BEND UPTO 90DEG

## (undated) DEVICE — GLOVE SURG SZ 9 THK91MIL LTX FREE SYN POLYISOPRENE ANTI

## (undated) DEVICE — Device

## (undated) DEVICE — JCKSON TBL POSTNER NO HD REST: Brand: MEDLINE INDUSTRIES, INC.

## (undated) DEVICE — BUR RND FLUT AGRSV 5MM

## (undated) DEVICE — AGENT HEMOSTATIC SURGIFLOW MATRIX KIT W/THROMBIN

## (undated) DEVICE — TUBING IV STOPCOCK 48 CM 3 W

## (undated) DEVICE — TOTAL TRAY, DB, 100% SILI FOLEY, 16FR 10: Brand: MEDLINE

## (undated) DEVICE — CATHETER ETER IV 16GA L125IN POLYUR STR HUB INTROCAN SFTY

## (undated) DEVICE — THE MILL DISPOSABLE - MEDIUM

## (undated) DEVICE — GLOVE ORANGE PI 8   MSG9080

## (undated) DEVICE — SUTURE ETHLN SZ 2-0 L30IN NONABSORBABLE BLK L36MM FSLX 3/8 1674H

## (undated) DEVICE — 1010 S-DRAPE TOWEL DRAPE 10/BX: Brand: STERI-DRAPE™

## (undated) DEVICE — AEGIS 1" DISK 4MM HOLE, PEEL OPEN: Brand: MEDLINE

## (undated) DEVICE — DRESSING,GAUZE,XEROFORM,CURAD,5"X9",ST: Brand: CURAD

## (undated) DEVICE — GOWN,SIRUS,NON REINFRCD,LARGE,SET IN SL: Brand: MEDLINE

## (undated) DEVICE — SUTURE ETHLN SZ 3-0 L18IN NONABSORBABLE BLK FS-1 L24MM 3/8 663H